# Patient Record
Sex: MALE | Race: WHITE | Employment: FULL TIME | URBAN - METROPOLITAN AREA
[De-identification: names, ages, dates, MRNs, and addresses within clinical notes are randomized per-mention and may not be internally consistent; named-entity substitution may affect disease eponyms.]

---

## 2017-03-14 ENCOUNTER — TELEPHONE (OUTPATIENT)
Dept: FAMILY MEDICINE CLINIC | Age: 57
End: 2017-03-14

## 2017-04-05 ENCOUNTER — OFFICE VISIT (OUTPATIENT)
Dept: FAMILY MEDICINE CLINIC | Age: 57
End: 2017-04-05
Payer: COMMERCIAL

## 2017-04-05 VITALS
WEIGHT: 194 LBS | OXYGEN SATURATION: 98 % | TEMPERATURE: 97.6 F | DIASTOLIC BLOOD PRESSURE: 81 MMHG | RESPIRATION RATE: 16 BRPM | HEIGHT: 69 IN | SYSTOLIC BLOOD PRESSURE: 134 MMHG | BODY MASS INDEX: 28.73 KG/M2 | HEART RATE: 68 BPM

## 2017-04-05 DIAGNOSIS — R90.89 ABNORMAL CT OF BRAIN: ICD-10-CM

## 2017-04-05 DIAGNOSIS — G56.92 NEUROPATHY OF HAND, LEFT: Primary | ICD-10-CM

## 2017-04-05 PROCEDURE — 99213 OFFICE O/P EST LOW 20 MIN: CPT | Performed by: FAMILY MEDICINE

## 2017-04-05 RX ORDER — MECLIZINE HYDROCHLORIDE 25 MG/1
25 TABLET ORAL
COMMUNITY
Start: 2017-04-02 | End: 2017-05-02

## 2017-04-05 RX ORDER — LORATADINE AND PSEUDOEPHEDRINE 10; 240 MG/1; MG/1
1 TABLET, EXTENDED RELEASE ORAL
COMMUNITY
End: 2017-04-05

## 2017-04-06 DIAGNOSIS — R90.89 ABNORMAL CT OF BRAIN: ICD-10-CM

## 2017-04-06 DIAGNOSIS — G56.92 NEUROPATHY OF HAND, LEFT: ICD-10-CM

## 2017-04-18 ENCOUNTER — OFFICE VISIT (OUTPATIENT)
Dept: FAMILY MEDICINE CLINIC | Age: 57
End: 2017-04-18
Payer: COMMERCIAL

## 2017-04-18 VITALS
DIASTOLIC BLOOD PRESSURE: 85 MMHG | HEART RATE: 90 BPM | RESPIRATION RATE: 16 BRPM | HEIGHT: 69 IN | BODY MASS INDEX: 29.18 KG/M2 | SYSTOLIC BLOOD PRESSURE: 140 MMHG | WEIGHT: 197 LBS | OXYGEN SATURATION: 98 %

## 2017-04-18 DIAGNOSIS — I63.9 CEREBROVASCULAR ACCIDENT (CVA), UNSPECIFIED MECHANISM (HCC): ICD-10-CM

## 2017-04-18 DIAGNOSIS — F41.9 ANXIETY: ICD-10-CM

## 2017-04-18 DIAGNOSIS — I10 ESSENTIAL HYPERTENSION: ICD-10-CM

## 2017-04-18 DIAGNOSIS — Z09 HOSPITAL DISCHARGE FOLLOW-UP: Primary | ICD-10-CM

## 2017-04-18 PROCEDURE — 99214 OFFICE O/P EST MOD 30 MIN: CPT | Performed by: FAMILY MEDICINE

## 2017-04-18 RX ORDER — ATORVASTATIN CALCIUM 40 MG/1
40 TABLET, FILM COATED ORAL
COMMUNITY
Start: 2017-04-06 | End: 2017-06-08

## 2017-04-18 RX ORDER — ASPIRIN 81 MG/1
81 TABLET, CHEWABLE ORAL
COMMUNITY
Start: 2017-04-06 | End: 2017-06-08

## 2017-04-18 RX ORDER — ALPRAZOLAM 0.5 MG/1
0.5 TABLET ORAL 3 TIMES DAILY PRN
Qty: 30 TABLET | Refills: 0 | Status: SHIPPED | OUTPATIENT
Start: 2017-04-18 | End: 2017-05-18

## 2017-04-18 RX ORDER — METOPROLOL SUCCINATE 50 MG/1
50 TABLET, EXTENDED RELEASE ORAL DAILY
Qty: 30 TABLET | Refills: 5 | Status: SHIPPED | OUTPATIENT
Start: 2017-04-18 | End: 2017-05-09 | Stop reason: SDUPTHER

## 2017-04-19 ENCOUNTER — TELEPHONE (OUTPATIENT)
Dept: FAMILY MEDICINE CLINIC | Age: 57
End: 2017-04-19

## 2017-04-20 DIAGNOSIS — I63.9 CEREBROVASCULAR ACCIDENT (CVA), UNSPECIFIED MECHANISM (HCC): ICD-10-CM

## 2017-04-20 DIAGNOSIS — R90.89 ABNORMAL BRAIN MRI: Primary | ICD-10-CM

## 2017-04-21 DIAGNOSIS — J20.8 ACUTE BRONCHITIS DUE TO OTHER SPECIFIED ORGANISMS: ICD-10-CM

## 2017-04-21 RX ORDER — FLUTICASONE FUROATE AND VILANTEROL TRIFENATATE 100; 25 UG/1; UG/1
1 POWDER RESPIRATORY (INHALATION) DAILY
Qty: 1 EACH | Refills: 11 | Status: SHIPPED | OUTPATIENT
Start: 2017-04-21 | End: 2017-06-08

## 2017-04-25 ENCOUNTER — TELEPHONE (OUTPATIENT)
Dept: FAMILY MEDICINE CLINIC | Age: 57
End: 2017-04-25

## 2017-04-26 ENCOUNTER — HOSPITAL ENCOUNTER (OUTPATIENT)
Dept: MRI IMAGING | Age: 57
Discharge: HOME | End: 2017-04-26
Payer: COMMERCIAL

## 2017-04-26 DIAGNOSIS — I63.9 CEREBROVASCULAR ACCIDENT (CVA), UNSPECIFIED MECHANISM (HCC): ICD-10-CM

## 2017-04-26 DIAGNOSIS — R90.89 ABNORMAL BRAIN MRI: ICD-10-CM

## 2017-04-26 PROCEDURE — 70544 MR ANGIOGRAPHY HEAD W/O DYE: CPT

## 2017-05-09 ENCOUNTER — OFFICE VISIT (OUTPATIENT)
Dept: FAMILY MEDICINE CLINIC | Age: 57
End: 2017-05-09
Payer: COMMERCIAL

## 2017-05-09 VITALS
SYSTOLIC BLOOD PRESSURE: 147 MMHG | BODY MASS INDEX: 29.95 KG/M2 | WEIGHT: 202.2 LBS | HEIGHT: 69 IN | RESPIRATION RATE: 16 BRPM | HEART RATE: 71 BPM | DIASTOLIC BLOOD PRESSURE: 96 MMHG

## 2017-05-09 DIAGNOSIS — J30.1 SEASONAL ALLERGIC RHINITIS DUE TO POLLEN: ICD-10-CM

## 2017-05-09 DIAGNOSIS — F41.9 ANXIETY: ICD-10-CM

## 2017-05-09 DIAGNOSIS — E78.2 MIXED HYPERLIPIDEMIA: ICD-10-CM

## 2017-05-09 DIAGNOSIS — I10 ESSENTIAL HYPERTENSION: ICD-10-CM

## 2017-05-09 DIAGNOSIS — H65.02 ACUTE SEROUS OTITIS MEDIA OF LEFT EAR, RECURRENCE NOT SPECIFIED: Primary | ICD-10-CM

## 2017-05-09 PROCEDURE — 99214 OFFICE O/P EST MOD 30 MIN: CPT | Performed by: FAMILY MEDICINE

## 2017-05-09 RX ORDER — CEFUROXIME AXETIL 500 MG/1
500 TABLET ORAL 2 TIMES DAILY
Qty: 20 TABLET | Refills: 0 | Status: SHIPPED | OUTPATIENT
Start: 2017-05-09 | End: 2017-05-19

## 2017-05-09 RX ORDER — ATORVASTATIN CALCIUM 20 MG/1
20 TABLET, FILM COATED ORAL DAILY
Qty: 30 TABLET | Refills: 2 | Status: SHIPPED | OUTPATIENT
Start: 2017-05-09 | End: 2017-06-08

## 2017-05-09 RX ORDER — LEVOCETIRIZINE DIHYDROCHLORIDE 5 MG/1
5 TABLET, FILM COATED ORAL NIGHTLY
Qty: 30 TABLET | Refills: 11 | Status: SHIPPED | OUTPATIENT
Start: 2017-05-09 | End: 2017-06-08

## 2017-05-09 RX ORDER — METOPROLOL SUCCINATE 50 MG/1
50 TABLET, EXTENDED RELEASE ORAL DAILY
Qty: 30 TABLET | Refills: 5 | Status: SHIPPED | OUTPATIENT
Start: 2017-05-09 | End: 2017-06-08

## 2017-05-09 RX ORDER — AMLODIPINE AND VALSARTAN 10; 320 MG/1; MG/1
1 TABLET ORAL DAILY
Qty: 30 TABLET | Refills: 5 | Status: SHIPPED | OUTPATIENT
Start: 2017-05-09 | End: 2017-06-08

## 2017-05-10 ENCOUNTER — OFFICE VISIT (OUTPATIENT)
Dept: PSYCHOLOGY | Age: 57
End: 2017-05-10
Payer: COMMERCIAL

## 2017-05-10 DIAGNOSIS — F06.4 ANXIETY DISORDER DUE TO MEDICAL CONDITION: Primary | ICD-10-CM

## 2017-05-10 DIAGNOSIS — I63.9 CEREBROVASCULAR ACCIDENT (CVA), UNSPECIFIED MECHANISM (HCC): ICD-10-CM

## 2017-05-10 PROCEDURE — 90791 PSYCH DIAGNOSTIC EVALUATION: CPT | Performed by: PSYCHOLOGIST

## 2017-05-16 ENCOUNTER — OFFICE VISIT (OUTPATIENT)
Dept: FAMILY MEDICINE CLINIC | Age: 57
End: 2017-05-16
Payer: COMMERCIAL

## 2017-05-16 VITALS
SYSTOLIC BLOOD PRESSURE: 138 MMHG | BODY MASS INDEX: 29.71 KG/M2 | HEIGHT: 69 IN | RESPIRATION RATE: 16 BRPM | DIASTOLIC BLOOD PRESSURE: 83 MMHG | HEART RATE: 80 BPM | WEIGHT: 200.62 LBS

## 2017-05-16 DIAGNOSIS — I10 ESSENTIAL HYPERTENSION: Primary | ICD-10-CM

## 2017-05-16 PROCEDURE — 99213 OFFICE O/P EST LOW 20 MIN: CPT | Performed by: FAMILY MEDICINE

## 2017-06-02 ENCOUNTER — TELEPHONE (OUTPATIENT)
Dept: FAMILY MEDICINE CLINIC | Age: 57
End: 2017-06-02

## 2017-06-03 RX ORDER — PREDNISONE 20 MG/1
TABLET ORAL
Qty: 20 TABLET | Refills: 0 | Status: SHIPPED | OUTPATIENT
Start: 2017-06-03 | End: 2017-06-08

## 2017-06-03 RX ORDER — ONDANSETRON HYDROCHLORIDE 8 MG/1
8 TABLET, FILM COATED ORAL EVERY 8 HOURS PRN
Qty: 30 TABLET | Refills: 0 | Status: SHIPPED | OUTPATIENT
Start: 2017-06-03 | End: 2017-06-08

## 2017-06-08 ENCOUNTER — OFFICE VISIT (OUTPATIENT)
Dept: FAMILY MEDICINE CLINIC | Age: 57
End: 2017-06-08
Payer: COMMERCIAL

## 2017-06-08 VITALS
HEART RATE: 69 BPM | WEIGHT: 202 LBS | RESPIRATION RATE: 16 BRPM | HEIGHT: 69 IN | BODY MASS INDEX: 29.92 KG/M2 | SYSTOLIC BLOOD PRESSURE: 143 MMHG | DIASTOLIC BLOOD PRESSURE: 84 MMHG

## 2017-06-08 DIAGNOSIS — H81.22 VESTIBULAR NEURITIS, LEFT: Primary | ICD-10-CM

## 2017-06-08 PROCEDURE — 99213 OFFICE O/P EST LOW 20 MIN: CPT | Performed by: FAMILY MEDICINE

## 2017-06-08 RX ORDER — AMLODIPINE AND VALSARTAN 10; 320 MG/1; MG/1
1 TABLET ORAL DAILY
COMMUNITY
End: 2021-11-01

## 2017-06-08 RX ORDER — FLUTICASONE FUROATE AND VILANTEROL 100; 25 UG/1; UG/1
1 POWDER RESPIRATORY (INHALATION) DAILY
COMMUNITY
End: 2021-11-01

## 2017-06-08 RX ORDER — AMOXICILLIN AND CLAVULANATE POTASSIUM 875; 125 MG/1; MG/1
1 TABLET, FILM COATED ORAL 2 TIMES DAILY WITH MEALS
Qty: 20 TABLET | Refills: 0 | Status: SHIPPED | OUTPATIENT
Start: 2017-06-08 | End: 2017-06-18

## 2017-06-08 RX ORDER — LEVOCETIRIZINE DIHYDROCHLORIDE 5 MG/1
5 TABLET, FILM COATED ORAL NIGHTLY
COMMUNITY
End: 2018-08-22

## 2017-06-08 RX ORDER — SILDENAFIL 100 MG/1
100 TABLET, FILM COATED ORAL PRN
COMMUNITY
End: 2018-08-22

## 2017-06-08 RX ORDER — METOPROLOL SUCCINATE 50 MG/1
50 TABLET, EXTENDED RELEASE ORAL DAILY
COMMUNITY
End: 2018-08-22

## 2017-06-08 RX ORDER — ALPRAZOLAM 0.5 MG/1
0.5 TABLET ORAL 3 TIMES DAILY PRN
COMMUNITY
End: 2021-11-01

## 2017-06-08 RX ORDER — ATORVASTATIN CALCIUM 40 MG/1
40 TABLET, FILM COATED ORAL DAILY
COMMUNITY
End: 2017-11-08

## 2017-06-12 ENCOUNTER — TELEPHONE (OUTPATIENT)
Dept: FAMILY MEDICINE CLINIC | Age: 57
End: 2017-06-12

## 2017-09-18 ENCOUNTER — OFFICE VISIT (OUTPATIENT)
Dept: FAMILY MEDICINE CLINIC | Age: 57
End: 2017-09-18
Payer: COMMERCIAL

## 2017-09-18 VITALS
RESPIRATION RATE: 16 BRPM | HEIGHT: 69 IN | HEART RATE: 59 BPM | DIASTOLIC BLOOD PRESSURE: 89 MMHG | BODY MASS INDEX: 29.86 KG/M2 | WEIGHT: 201.6 LBS | SYSTOLIC BLOOD PRESSURE: 157 MMHG

## 2017-09-18 DIAGNOSIS — J02.9 ACUTE PHARYNGITIS, UNSPECIFIED ETIOLOGY: Primary | ICD-10-CM

## 2017-09-18 DIAGNOSIS — N52.01 ERECTILE DYSFUNCTION DUE TO ARTERIAL INSUFFICIENCY: ICD-10-CM

## 2017-09-18 PROCEDURE — 99213 OFFICE O/P EST LOW 20 MIN: CPT | Performed by: FAMILY MEDICINE

## 2017-09-18 RX ORDER — LOTEPREDNOL ETABONATE 2 MG/ML
SUSPENSION/ DROPS OPHTHALMIC
Refills: 0 | COMMUNITY
Start: 2017-08-10

## 2017-09-18 RX ORDER — TADALAFIL 5 MG
TABLET ORAL
Refills: 1 | COMMUNITY
Start: 2017-09-04 | End: 2021-11-01

## 2017-09-18 RX ORDER — TADALAFIL 5 MG/1
5 TABLET ORAL PRN
Qty: 30 TABLET | Refills: 11 | Status: SHIPPED | OUTPATIENT
Start: 2017-09-18 | End: 2018-09-18 | Stop reason: SDUPTHER

## 2017-09-18 RX ORDER — BEPOTASTINE BESILATE 15 MG/ML
SOLUTION/ DROPS OPHTHALMIC
Refills: 0 | COMMUNITY
Start: 2017-08-10 | End: 2021-11-01

## 2017-09-18 RX ORDER — AMOXICILLIN AND CLAVULANATE POTASSIUM 875; 125 MG/1; MG/1
1 TABLET, FILM COATED ORAL 2 TIMES DAILY WITH MEALS
Qty: 20 TABLET | Refills: 0 | Status: SHIPPED | OUTPATIENT
Start: 2017-09-18 | End: 2017-09-28

## 2017-09-18 ASSESSMENT — PATIENT HEALTH QUESTIONNAIRE - PHQ9
SUM OF ALL RESPONSES TO PHQ9 QUESTIONS 1 & 2: 0
2. FEELING DOWN, DEPRESSED OR HOPELESS: 0
1. LITTLE INTEREST OR PLEASURE IN DOING THINGS: 0
SUM OF ALL RESPONSES TO PHQ QUESTIONS 1-9: 0

## 2017-11-08 ENCOUNTER — OFFICE VISIT (OUTPATIENT)
Dept: FAMILY MEDICINE CLINIC | Age: 57
End: 2017-11-08
Payer: COMMERCIAL

## 2017-11-08 VITALS
SYSTOLIC BLOOD PRESSURE: 146 MMHG | HEART RATE: 89 BPM | WEIGHT: 206 LBS | RESPIRATION RATE: 16 BRPM | BODY MASS INDEX: 30.51 KG/M2 | DIASTOLIC BLOOD PRESSURE: 90 MMHG | HEIGHT: 69 IN

## 2017-11-08 DIAGNOSIS — H65.23 CHRONIC SEROUS OTITIS MEDIA OF BOTH EARS: ICD-10-CM

## 2017-11-08 DIAGNOSIS — I10 ESSENTIAL HYPERTENSION: Primary | ICD-10-CM

## 2017-11-08 DIAGNOSIS — Z78.9 STATIN INTOLERANCE: ICD-10-CM

## 2017-11-08 DIAGNOSIS — E78.00 PURE HYPERCHOLESTEROLEMIA: ICD-10-CM

## 2017-11-08 PROCEDURE — 99214 OFFICE O/P EST MOD 30 MIN: CPT | Performed by: FAMILY MEDICINE

## 2017-11-08 RX ORDER — MESALAMINE 1.2 G/1
TABLET, DELAYED RELEASE ORAL
Refills: 0 | COMMUNITY
Start: 2017-11-06 | End: 2018-05-14 | Stop reason: SDUPTHER

## 2017-11-08 RX ORDER — METOPROLOL SUCCINATE 100 MG/1
100 TABLET, EXTENDED RELEASE ORAL DAILY
Qty: 30 TABLET | Refills: 3 | Status: SHIPPED | OUTPATIENT
Start: 2017-11-08 | End: 2018-08-22

## 2017-11-08 NOTE — PROGRESS NOTES
[facility] PHYSICIANS  COMPREHENSIVE CARE  [address]  [address] [address]  Dept: [phone]      Nini Ocampo is a 62 y.o. male who presents today for follow up on his  medical conditions as noted below.       Chief Complaint   Patient presents with    Medication Check     states the lipitor is causing him to having sore muscles/joints    Ear Fullness     off/on L ear fullness/fluid, sees ent next tuesday       Patient Active Problem List:     Seasonal allergies     Strep pharyngitis     BP (high blood pressure)     Cerebrovascular accident (CVA) ([facility] [address]     Past Medical History:   Diagnosis Date    BP (high blood pressure) 4/18/2017    Fatty liver     Seasonal allergies     Stroke [facility])       Past Surgical History:   Procedure Laterality Date    TONSILLECTOMY       Family History   Problem Relation Age of Onset    Cancer Mother        Current Outpatient Prescriptions   Medication Sig Dispense Refill    mesalamine (LIALDA) 1.2 g EC tablet take 2 tablets by mouth once daily  0    pitavastatin (LIVALO) 2 MG TABS tablet Take 1 tablet by mouth daily 30 tablet 2    metoprolol succinate (TOPROL XL) 100 MG extended release tablet Take 1 tablet by mouth daily 30 tablet 3    BEPREVE 1.5 % SOLN   0    ALREX 0.2 % SUSP   0    CIALIS 5 MG tablet   1    tadalafil (CIALIS) 5 MG tablet Take 1 tablet by mouth as needed for Erectile Dysfunction 30 tablet 11    ALPRAZolam (XANAX) 0.5 MG tablet Take 0.5 mg by mouth 3 times daily as needed for Sleep      amLODIPine-valsartan (EXFORGE)  MG per tablet Take 1 tablet by mouth daily      aspirin 81 MG tablet Take 81 mg by mouth daily      fluticasone-vilanterol (BREO ELLIPTA) 100-25 MCG/INH AEPB inhaler Inhale 1 puff into the lungs daily      levocetirizine (XYZAL) 5 MG tablet Take 5 mg by mouth nightly      metoprolol succinate (TOPROL XL) 50 MG extended release tablet Take 50 mg by mouth daily      sildenafil (VIAGRA) 100 MG tablet Take Skin is warm and dry. No rash noted. Psychiatric: He has a normal mood and affect. His   behavior is normal.       Assessment:      1. Essential hypertension    2. Pure hypercholesterolemia    3. Statin intolerance    4. Chronic serous otitis media of both ears          Plan:      Call or return to clinic prn if these symptoms worsen or fail to improve as anticipated. I have reviewed the instructions with the patient, answering all questions to his satisfaction. No Follow-up on file. No orders of the defined types were placed in this encounter.     Orders Placed This Encounter   Medications    pitavastatin (LIVALO) 2 MG TABS tablet     Sig: Take 1 tablet by mouth daily     Dispense:  30 tablet     Refill:  2    metoprolol succinate (TOPROL XL) 100 MG extended release tablet     Sig: Take 1 tablet by mouth daily     Dispense:  30 tablet     Refill:  3    discuss patient that he really needs to follow-up with ENT and I feel he probably needs a tube  He needs to increase his metoprolol to 100 mg  Stop the Lipitor and try Livalo and follow-up in the office if not improving    Electronically signed by Saranya Chavez DO on 11/8/2017 at 2:09 PM

## 2017-11-08 NOTE — PROGRESS NOTES
Have you seen any other physician or provider since your last visit no    Have you had any other diagnostic tests since your last visit? no    Have you changed or stopped any medications since your last visit including any over-the-counter medicines, vitamins, or herbal medicines? no     Are you taking all your prescribed medications? Yes  If NO, why? -  N/A           Patient Self-Management Goal for this visit.    What is your goal for your visit today? - med check   Barriers to success: none   Plan for overcoming my barriers: N/A      Confidence: 10/10   Date goal set: 11/8/17   Date expected to reach goal: 2days

## 2017-12-18 DIAGNOSIS — I10 ESSENTIAL HYPERTENSION: Primary | ICD-10-CM

## 2017-12-18 DIAGNOSIS — R97.20 ELEVATED PSA: ICD-10-CM

## 2017-12-26 ENCOUNTER — TELEPHONE (OUTPATIENT)
Dept: FAMILY MEDICINE CLINIC | Age: 57
End: 2017-12-26

## 2017-12-26 RX ORDER — LEVOFLOXACIN 500 MG/1
500 TABLET, FILM COATED ORAL DAILY
Qty: 10 TABLET | Refills: 0 | Status: SHIPPED | OUTPATIENT
Start: 2017-12-26 | End: 2018-01-05

## 2017-12-26 RX ORDER — BENZONATATE 100 MG/1
200 CAPSULE ORAL 3 TIMES DAILY PRN
Qty: 60 CAPSULE | Refills: 0 | Status: SHIPPED | OUTPATIENT
Start: 2017-12-26 | End: 2018-01-05

## 2017-12-26 NOTE — TELEPHONE ENCOUNTER
Patient calling for med to be called in for possible URI x10 days coughing up yellow mucus.  I told him he would need to be seen, but he wanted me to send a message first.

## 2018-03-19 DIAGNOSIS — Z78.9 STATIN INTOLERANCE: ICD-10-CM

## 2018-03-19 DIAGNOSIS — E78.00 PURE HYPERCHOLESTEROLEMIA: ICD-10-CM

## 2018-03-20 RX ORDER — PITAVASTATIN CALCIUM 2.09 MG/1
TABLET, FILM COATED ORAL
Qty: 30 TABLET | Refills: 2 | Status: SHIPPED | OUTPATIENT
Start: 2018-03-20 | End: 2018-06-25 | Stop reason: SDUPTHER

## 2018-05-14 ENCOUNTER — OFFICE VISIT (OUTPATIENT)
Dept: FAMILY MEDICINE CLINIC | Age: 58
End: 2018-05-14
Payer: COMMERCIAL

## 2018-05-14 VITALS
TEMPERATURE: 97.6 F | BODY MASS INDEX: 30.81 KG/M2 | HEART RATE: 69 BPM | RESPIRATION RATE: 16 BRPM | HEIGHT: 69 IN | OXYGEN SATURATION: 97 % | WEIGHT: 208 LBS | SYSTOLIC BLOOD PRESSURE: 135 MMHG | DIASTOLIC BLOOD PRESSURE: 82 MMHG

## 2018-05-14 DIAGNOSIS — K12.0 CANKER SORES ORAL: Primary | ICD-10-CM

## 2018-05-14 DIAGNOSIS — J02.9 ACUTE VIRAL PHARYNGITIS: ICD-10-CM

## 2018-05-14 DIAGNOSIS — K51.011 ULCERATIVE PANCOLITIS WITH RECTAL BLEEDING (HCC): ICD-10-CM

## 2018-05-14 DIAGNOSIS — J30.1 SEASONAL ALLERGIC RHINITIS DUE TO POLLEN: ICD-10-CM

## 2018-05-14 PROBLEM — K51.90 ULCERATIVE COLITIS (HCC): Status: ACTIVE | Noted: 2018-05-14

## 2018-05-14 PROCEDURE — 99214 OFFICE O/P EST MOD 30 MIN: CPT | Performed by: FAMILY MEDICINE

## 2018-05-14 RX ORDER — MESALAMINE 1.2 G/1
TABLET, DELAYED RELEASE ORAL
Qty: 60 TABLET | Refills: 0 | Status: SHIPPED | OUTPATIENT
Start: 2018-05-14 | End: 2021-11-01

## 2018-05-14 RX ORDER — LEVOCETIRIZINE DIHYDROCHLORIDE 5 MG/1
TABLET, FILM COATED ORAL
Qty: 30 TABLET | Refills: 11 | Status: SHIPPED | OUTPATIENT
Start: 2018-05-14

## 2018-05-14 RX ORDER — DEXAMETHASONE 0.5 MG/5ML
1 ELIXIR ORAL 4 TIMES DAILY
Qty: 400 ML | Refills: 0 | Status: SHIPPED | OUTPATIENT
Start: 2018-05-14 | End: 2018-05-24

## 2018-05-14 RX ORDER — CEFUROXIME AXETIL 500 MG/1
500 TABLET ORAL 2 TIMES DAILY
Qty: 20 TABLET | Refills: 0 | Status: SHIPPED | OUTPATIENT
Start: 2018-05-14 | End: 2018-05-24

## 2018-05-14 ASSESSMENT — PATIENT HEALTH QUESTIONNAIRE - PHQ9
SUM OF ALL RESPONSES TO PHQ9 QUESTIONS 1 & 2: 0
1. LITTLE INTEREST OR PLEASURE IN DOING THINGS: 0
SUM OF ALL RESPONSES TO PHQ QUESTIONS 1-9: 0
2. FEELING DOWN, DEPRESSED OR HOPELESS: 0

## 2018-06-05 ENCOUNTER — HOSPITAL ENCOUNTER (OUTPATIENT)
Age: 58
Discharge: HOME | End: 2018-06-05
Payer: COMMERCIAL

## 2018-06-05 PROCEDURE — 36415 COLL VENOUS BLD VENIPUNCTURE: CPT

## 2018-06-05 PROCEDURE — G0103 PSA SCREENING: HCPCS

## 2018-06-06 LAB — PROSTATE SPECIFIC ANTIGEN: 4.97 UG/L

## 2018-08-17 ENCOUNTER — TELEPHONE (OUTPATIENT)
Dept: FAMILY MEDICINE CLINIC | Age: 58
End: 2018-08-17

## 2018-08-17 DIAGNOSIS — M79.673 PAIN OF FOOT, UNSPECIFIED LATERALITY: Primary | ICD-10-CM

## 2018-08-20 NOTE — TELEPHONE ENCOUNTER
There is no DO who does foot and ankle. There are orthopedic foot and ankle specialist can refer him to.   Okay to just give them their names

## 2018-08-22 ENCOUNTER — OFFICE VISIT (OUTPATIENT)
Dept: FAMILY MEDICINE CLINIC | Age: 58
End: 2018-08-22
Payer: COMMERCIAL

## 2018-08-22 VITALS
RESPIRATION RATE: 16 BRPM | HEIGHT: 69 IN | HEART RATE: 69 BPM | SYSTOLIC BLOOD PRESSURE: 155 MMHG | WEIGHT: 200 LBS | BODY MASS INDEX: 29.62 KG/M2 | DIASTOLIC BLOOD PRESSURE: 86 MMHG

## 2018-08-22 DIAGNOSIS — M72.2 PLANTAR FASCIITIS OF LEFT FOOT: Primary | ICD-10-CM

## 2018-08-22 PROCEDURE — 99213 OFFICE O/P EST LOW 20 MIN: CPT | Performed by: FAMILY MEDICINE

## 2018-08-22 ASSESSMENT — PATIENT HEALTH QUESTIONNAIRE - PHQ9
1. LITTLE INTEREST OR PLEASURE IN DOING THINGS: 0
SUM OF ALL RESPONSES TO PHQ9 QUESTIONS 1 & 2: 0
2. FEELING DOWN, DEPRESSED OR HOPELESS: 0
SUM OF ALL RESPONSES TO PHQ QUESTIONS 1-9: 0
SUM OF ALL RESPONSES TO PHQ QUESTIONS 1-9: 0

## 2018-08-22 NOTE — PROGRESS NOTES
use No        LDL Cholesterol (mg/dL)   Date Value   06/23/2016 96     HDL (mg/dL)   Date Value   06/23/2016 37 (L)     BUN (mg/dL)   Date Value   06/23/2016 19     CREATININE (mg/dL)   Date Value   06/23/2016 0.89     Glucose (mg/dL)   Date Value   06/23/2016 95              Subjective:      HPI  He is here today complaining of left heel pain and he is tried to start running and isn't ready for the last month and the heel pain started he didn't have necessary specific running shoes he claims to have been stretching it is been better since he's been resting it  the pain is worse in the morning has not tried any anti-inflammatories    Review of Systems:     Constitutional: Negative for fever, appetite change and fatigue. Family social and medical history reviewed and unchanged     HENT: Negative. Negative for nosebleeds, trouble swallowing and neck pain. Eyes: Negative for photophobia and visual disturbance. Respiratory: Negative. Negative for chest tightness and shortness of breath. Cardiovascular: Negative. Negative for chest pain and leg swelling. Gastrointestinal: Negative. Negative for abdominal pain and blood in stool. Endocrine: Negative for cold intolerance and polyuria. Genitourinary: Negative for dysuria and hematuria. Musculoskeletal: Negative. Skin: Negative for rash. Allergic/Immunologic: Negative. Neurological: Negative. Negative for dizziness, weakness and numbness. Hematological: Negative. Negative for adenopathy. Does not bruise/bleed easily. Psychiatric/Behavioral: Negative for sleep disturbance, dysphoric mood and  decreased concentration. The patient is not nervous/anxious. Objective:     Physical Exam:     Nursing note and vitals reviewed. BP (!) 155/86   Pulse 69   Resp 16   Ht 5' 8.5\" (1.74 m)   Wt 200 lb (90.7 kg)   BMI 29.97 kg/m²   Constitutional: He is oriented to person, place, and time.  He   appears well-developed and

## 2018-09-04 ENCOUNTER — OFFICE VISIT (OUTPATIENT)
Dept: PODIATRY | Age: 58
End: 2018-09-04
Payer: COMMERCIAL

## 2018-09-04 VITALS — BODY MASS INDEX: 30.46 KG/M2 | RESPIRATION RATE: 16 BRPM | HEIGHT: 68 IN | WEIGHT: 201 LBS

## 2018-09-04 DIAGNOSIS — M76.61 ACHILLES TENDINITIS, RIGHT LEG: ICD-10-CM

## 2018-09-04 DIAGNOSIS — S93.491A SPRAIN OF ANTERIOR TALOFIBULAR LIGAMENT OF RIGHT ANKLE, INITIAL ENCOUNTER: Primary | ICD-10-CM

## 2018-09-04 DIAGNOSIS — M25.571 ACUTE RIGHT ANKLE PAIN: ICD-10-CM

## 2018-09-04 PROCEDURE — 99203 OFFICE O/P NEW LOW 30 MIN: CPT | Performed by: PODIATRIST

## 2018-09-04 PROCEDURE — L4361 PNEUMA/VAC WALK BOOT PRE OTS: HCPCS | Performed by: PODIATRIST

## 2018-09-04 NOTE — PROGRESS NOTES
[facility] PHYSICIANS  [facility] [address]  [address] [facility] [address]  Dept: [phone]  Dept Fax: [phone]    NEW PATIENT PROGRESS NOTE  Date of patient's visit: 9/4/2018  Patient's Name:  Janie Wagoner YOB: 1960            Patient Care Team:  Demarcus Williamson DO as PCP - General (Family Medicine)        Chief Complaint   Patient presents with    New Patient    Ankle Pain    Foot Injury      x2 month         HPI: Janie Wagoner is a 62 y.o. male who presents to the office today complaining of left heel and ankle pain. Worse in the morning  Symptoms began 2 month(s) ago. Patient relates pain is Present. Pain is rated 7 out of 10 and is described as intermittent. Treatments prior to today's visit include: saw pcp who referred to  podiatry. Currently denies F/C/N/V. Allergies   Allergen Reactions    Clindamycin/Lincomycin Diarrhea    Seasonal        Past Medical History:   Diagnosis Date    BP (high blood pressure) 4/18/2017    Fatty liver     Seasonal allergies     Stroke [facility])        Prior to Admission medications    Medication Sig Start Date End Date Taking?  Authorizing Provider   LIVALO 2 MG TABS tablet take 1 tablet by mouth once daily 6/25/18   Jory Vargas DO   mesalamine (LIALDA) 1.2 g EC tablet Take 2 tablets by mouth once daily 5/14/18   Jory Vargas DO   levocetirizine (XYZAL) 5 MG tablet take 1 tablet by mouth every evening 5/14/18   Jory Vargas DO   BEPREVE 1.5 % SOLN  8/10/17   Historical Provider, MD   ALREX 0.2 % SUSP  8/10/17   Historical Provider, MD   CIALIS 5 MG tablet  9/4/17   Historical Provider, MD   tadalafil (CIALIS) 5 MG tablet Take 1 tablet by mouth as needed for Erectile Dysfunction 9/18/17   Jory Vargas DO   ALPRAZolam (XANAX) 0.5 MG tablet Take 0.5 mg by mouth 3 times daily as needed for Sleep    Historical Provider, MD   amLODIPine-valsartan (EXFORGE)  MG per tablet Take 1 tablet by mouth daily Historical Provider, MD   aspirin 81 MG tablet Take 81 mg by mouth daily    Historical Provider, MD   fluticasone-vilanterol (BREO ELLIPTA) 100-25 MCG/INH AEPB inhaler Inhale 1 puff into the lungs daily    Historical Provider, MD       Past Surgical History:   Procedure Laterality Date    TONSILLECTOMY         Family History   Problem Relation Age of Onset    Cancer Mother        Social History   Substance Use Topics    Smoking status: Never Smoker    Smokeless tobacco: Never Used    Alcohol use No       Review of Systems  Review of Systems - History obtained from chart review and the patient  General ROS: negative for - chills, fatigue, fever, night sweats or weight gain  Constitutional: Negative for chills, diaphoresis, fatigue, fever and unexpected weight change. Musculoskeletal: Positive for arthralgias, gait problem and joint swelling. Neurological ROS: negative for - behavioral changes, confusion, headaches or seizures. Negative for weakness and numbness. Dermatological ROS: negative for - mole changes, rash  Cardiovascular: Negative for leg swelling. Gastrointestinal: Negative for constipation, diarrhea, nausea and vomiting. Lower Extremity Physical Examination:     Vitals:   Vitals:    09/04/18 1505   Resp: 16     General: AAO x 3 in NAD. Dermatologic Exam:  Skin lesion/ulceration Absent . Skin No rashes or nodules noted. .       Musculoskeletal:     1st MPJ ROM decreased, Bilateral.  Muscle strength 5/5, Bilateral.  Pain present upon palpation of right postrior achilles tendon at the posterior heel right. Pain increased with Dorsiflexion of the right ankle. Medial longitudinal arch, Bilateral WNL.   Ankle ROM WNL,Bilateral.    Dorsally contracted digits absent digits 1-5 Bilateral.     Vascular: DP and PT pulses palpable 2/4, Bilateral.  CFT <3 seconds, Bilateral.  Hair growth present to the level of the digits, Bilateral.  Edema absent, Bilateral.  Varicosities absent,

## 2018-09-18 ENCOUNTER — OFFICE VISIT (OUTPATIENT)
Dept: PODIATRY | Age: 58
End: 2018-09-18
Payer: COMMERCIAL

## 2018-09-18 VITALS — WEIGHT: 201 LBS | BODY MASS INDEX: 30.46 KG/M2 | HEIGHT: 68 IN | RESPIRATION RATE: 16 BRPM

## 2018-09-18 DIAGNOSIS — M79.671 PAIN IN BOTH FEET: Primary | ICD-10-CM

## 2018-09-18 DIAGNOSIS — M79.672 PAIN IN BOTH FEET: Primary | ICD-10-CM

## 2018-09-18 DIAGNOSIS — M25.571 ACUTE RIGHT ANKLE PAIN: ICD-10-CM

## 2018-09-18 DIAGNOSIS — N52.01 ERECTILE DYSFUNCTION DUE TO ARTERIAL INSUFFICIENCY: ICD-10-CM

## 2018-09-18 PROCEDURE — 99213 OFFICE O/P EST LOW 20 MIN: CPT | Performed by: PODIATRIST

## 2018-09-18 NOTE — PROGRESS NOTES
[facility] PHYSICIANS  [facility] [address]  [address] [facility] [address]   Dept: [phone]  Dept Fax: [phone]    RETURN PATIENT PROGRESS NOTE  Date of patient's visit: 9/18/2018  Patient's Name:  Magy Miller YOB: 1960            Patient Care Team:  Amando Cervantes DO as PCP - General (Family Medicine)       Magy Miller 62 y.o. male that presents for follow-up of   Chief Complaint   Patient presents with    Ankle Pain    Follow-up       Symptoms began 10 week(s) ago and are decreased . Patient relates pain is Present. Pain is rated 3 out of 10 and is described as intermittent. Treatments prior to today's visit include: was placed in walking boot  Which has improved the ankle. Currently denies F/C/N/V. Allergies   Allergen Reactions    Clindamycin/Lincomycin Diarrhea    Seasonal        Past Medical History:   Diagnosis Date    BP (high blood pressure) 4/18/2017    Fatty liver     Seasonal allergies     Stroke [facility])        Prior to Admission medications    Medication Sig Start Date End Date Taking?  Authorizing Provider   LIVALO 2 MG TABS tablet take 1 tablet by mouth once daily 6/25/18   Jory Vargas DO   mesalamine (LIALDA) 1.2 g EC tablet Take 2 tablets by mouth once daily 5/14/18   Jory Vargas DO   levocetirizine (XYZAL) 5 MG tablet take 1 tablet by mouth every evening 5/14/18   Jory Vargas DO   BEPREVE 1.5 % SOLN  8/10/17   Historical Provider, MD   ALREX 0.2 % SUSP  8/10/17   Historical Provider, MD   CIALIS 5 MG tablet  9/4/17   Historical Provider, MD   tadalafil (CIALIS) 5 MG tablet Take 1 tablet by mouth as needed for Erectile Dysfunction 9/18/17   Jory Vargas DO   ALPRAZolam (XANAX) 0.5 MG tablet Take 0.5 mg by mouth 3 times daily as needed for Sleep    Historical Provider, MD   amLODIPine-valsartan (EXFORGE)  MG per tablet Take 1 tablet by mouth daily    Historical Provider, MD   aspirin 81 MG tablet Take 81 mg by mouth daily    Historical Provider, MD   fluticasone-vilanterol (BREO ELLIPTA) 100-25 MCG/INH AEPB inhaler Inhale 1 puff into the lungs daily    Historical Provider, MD       Review of Systems  Review of Systems - History obtained from chart review and the patient  General ROS: negative for - chills, fatigue, fever, night sweats or weight gain  Constitutional: Negative for chills, diaphoresis, fatigue, fever and unexpected weight change. Musculoskeletal: Positive for arthralgias, gait problem and joint swelling. Neurological ROS: negative for - behavioral changes, confusion, headaches or seizures. Negative for weakness and numbness. Dermatological ROS: negative for - mole changes, rash  Cardiovascular: Negative for leg swelling. Gastrointestinal: Negative for constipation, diarrhea, nausea and vomiting. Lower Extremity Physical Examination:     Vitals:   Vitals:    09/18/18 1510   Resp: 16     General: AAO x 3 in NAD. Dermatologic Exam:  Skin lesion/ulceration Absent . Skin No rashes or nodules noted. .       Musculoskeletal:     1st MPJ ROM decreased, Bilateral.  Muscle strength 5/5, Bilateral.  Pain present upon palpation of right postrior achilles tendon at the posterior heel right. Pain increased with Dorsiflexion of the right ankle. Medial longitudinal arch, Bilateral WNL.   Ankle ROM WNL,Bilateral.    Dorsally contracted digits absent digits 1-5 Bilateral.     Vascular: DP and PT pulses palpable 2/4, Bilateral.  CFT <3 seconds, Bilateral.  Hair growth present to the level of the digits, Bilateral.  Edema absent, Bilateral.  Varicosities absent, Bilateral. Erythema absent, Bilateral    Neurological: Sensation intact to light touch to level of digits, Bilateral.  Protective sensation intact 10/10 sites via 5.07/10g [place] Monofilament, Bilateral.  negative Tinel's, Bilateral.  negative Valleix sign, Bilateral.      Integument: Warm, dry, supple, Bilateral.  Open lesion absent,

## 2018-09-19 RX ORDER — TADALAFIL 5 MG
TABLET ORAL
Qty: 30 TABLET | Refills: 11 | Status: SHIPPED | OUTPATIENT
Start: 2018-09-19 | End: 2021-11-01

## 2018-10-09 ENCOUNTER — OFFICE VISIT (OUTPATIENT)
Dept: PODIATRY | Age: 58
End: 2018-10-09

## 2018-10-09 DIAGNOSIS — M72.2 PLANTAR FASCIAL FIBROMATOSIS: Primary | ICD-10-CM

## 2018-10-09 PROCEDURE — 99999 PR OFFICE/OUTPT VISIT,PROCEDURE ONLY: CPT | Performed by: PODIATRIST

## 2019-06-24 DIAGNOSIS — E78.00 PURE HYPERCHOLESTEROLEMIA: ICD-10-CM

## 2019-06-24 DIAGNOSIS — Z78.9 STATIN INTOLERANCE: ICD-10-CM

## 2019-06-25 RX ORDER — PITAVASTATIN CALCIUM 2.09 MG/1
TABLET, FILM COATED ORAL
Qty: 30 TABLET | Refills: 11 | Status: SHIPPED | OUTPATIENT
Start: 2019-06-25 | End: 2019-12-02 | Stop reason: SDUPTHER

## 2019-11-07 ENCOUNTER — TELEPHONE (OUTPATIENT)
Dept: FAMILY MEDICINE CLINIC | Age: 59
End: 2019-11-07

## 2019-11-07 NOTE — TELEPHONE ENCOUNTER
pts states he needs anopther PA for Livalo because [employer] insurance policy changed last month.   [employer] PA number [phone]

## 2019-11-07 NOTE — TELEPHONE ENCOUNTER
Not had blood work since 2017 [employer] will never authorize it unless he has a current cholesterol level  He needs an appointment

## 2019-11-21 ENCOUNTER — NURSE TRIAGE (OUTPATIENT)
Dept: OTHER | Facility: CLINIC | Age: 59
End: 2019-11-21

## 2019-12-02 ENCOUNTER — OFFICE VISIT (OUTPATIENT)
Dept: FAMILY MEDICINE CLINIC | Age: 59
End: 2019-12-02
Payer: COMMERCIAL

## 2019-12-02 VITALS
HEIGHT: 69 IN | HEART RATE: 68 BPM | WEIGHT: 201 LBS | DIASTOLIC BLOOD PRESSURE: 91 MMHG | OXYGEN SATURATION: 98 % | BODY MASS INDEX: 29.77 KG/M2 | RESPIRATION RATE: 16 BRPM | SYSTOLIC BLOOD PRESSURE: 154 MMHG

## 2019-12-02 DIAGNOSIS — E78.00 PURE HYPERCHOLESTEROLEMIA: Primary | ICD-10-CM

## 2019-12-02 DIAGNOSIS — Z78.9 STATIN INTOLERANCE: ICD-10-CM

## 2019-12-02 LAB
CHOLESTEROL, TOTAL: 154 MG/DL
CHOLESTEROL/HDL RATIO: 3.9
HDLC SERPL-MCNC: 39 MG/DL (ref 35–70)
LDL CHOLESTEROL CALCULATED: 85 MG/DL (ref 0–160)
TRIGL SERPL-MCNC: 150 MG/DL
VLDLC SERPL CALC-MCNC: 30 MG/DL

## 2019-12-02 PROCEDURE — 99213 OFFICE O/P EST LOW 20 MIN: CPT | Performed by: FAMILY MEDICINE

## 2019-12-02 ASSESSMENT — PATIENT HEALTH QUESTIONNAIRE - PHQ9
SUM OF ALL RESPONSES TO PHQ9 QUESTIONS 1 & 2: 0
SUM OF ALL RESPONSES TO PHQ QUESTIONS 1-9: 0
1. LITTLE INTEREST OR PLEASURE IN DOING THINGS: 0
SUM OF ALL RESPONSES TO PHQ QUESTIONS 1-9: 0
2. FEELING DOWN, DEPRESSED OR HOPELESS: 0

## 2019-12-03 ENCOUNTER — TELEPHONE (OUTPATIENT)
Dept: FAMILY MEDICINE CLINIC | Age: 59
End: 2019-12-03

## 2019-12-10 ENCOUNTER — NURSE ONLY (OUTPATIENT)
Dept: FAMILY MEDICINE CLINIC | Age: 59
End: 2019-12-10
Payer: COMMERCIAL

## 2019-12-10 DIAGNOSIS — Z23 NEED FOR INFLUENZA VACCINATION: Primary | ICD-10-CM

## 2019-12-10 PROCEDURE — 90471 IMMUNIZATION ADMIN: CPT | Performed by: FAMILY MEDICINE

## 2019-12-10 PROCEDURE — 90686 IIV4 VACC NO PRSV 0.5 ML IM: CPT | Performed by: FAMILY MEDICINE

## 2019-12-12 ENCOUNTER — TELEPHONE (OUTPATIENT)
Dept: FAMILY MEDICINE CLINIC | Age: 59
End: 2019-12-12

## 2019-12-12 RX ORDER — ROSUVASTATIN CALCIUM 20 MG/1
20 TABLET, COATED ORAL NIGHTLY
Qty: 90 TABLET | Refills: 3 | Status: SHIPPED | OUTPATIENT
Start: 2019-12-12 | End: 2020-12-03 | Stop reason: SDUPTHER

## 2020-12-03 ENCOUNTER — VIRTUAL VISIT (OUTPATIENT)
Dept: FAMILY MEDICINE CLINIC | Age: 60
End: 2020-12-03
Payer: COMMERCIAL

## 2020-12-03 PROCEDURE — 99443 PR PHYS/QHP TELEPHONE EVALUATION 21-30 MIN: CPT | Performed by: FAMILY MEDICINE

## 2020-12-03 RX ORDER — METOPROLOL SUCCINATE 25 MG/1
TABLET, EXTENDED RELEASE ORAL
COMMUNITY
Start: 2020-11-30

## 2020-12-03 RX ORDER — LISINOPRIL 5 MG/1
TABLET ORAL
COMMUNITY
Start: 2020-11-30

## 2020-12-03 RX ORDER — ROSUVASTATIN CALCIUM 20 MG/1
20 TABLET, COATED ORAL NIGHTLY
Qty: 90 TABLET | Refills: 3 | Status: SHIPPED | OUTPATIENT
Start: 2020-12-03 | End: 2022-04-20

## 2020-12-03 SDOH — ECONOMIC STABILITY: FOOD INSECURITY: WITHIN THE PAST 12 MONTHS, YOU WORRIED THAT YOUR FOOD WOULD RUN OUT BEFORE YOU GOT MONEY TO BUY MORE.: NEVER TRUE

## 2020-12-03 SDOH — ECONOMIC STABILITY: INCOME INSECURITY: HOW HARD IS IT FOR YOU TO PAY FOR THE VERY BASICS LIKE FOOD, HOUSING, MEDICAL CARE, AND HEATING?: NOT HARD AT ALL

## 2020-12-03 SDOH — ECONOMIC STABILITY: FOOD INSECURITY: WITHIN THE PAST 12 MONTHS, THE FOOD YOU BOUGHT JUST DIDN'T LAST AND YOU DIDN'T HAVE MONEY TO GET MORE.: NEVER TRUE

## 2020-12-03 ASSESSMENT — PATIENT HEALTH QUESTIONNAIRE - PHQ9
SUM OF ALL RESPONSES TO PHQ QUESTIONS 1-9: 0
SUM OF ALL RESPONSES TO PHQ QUESTIONS 1-9: 0
2. FEELING DOWN, DEPRESSED OR HOPELESS: 0
SUM OF ALL RESPONSES TO PHQ9 QUESTIONS 1 & 2: 0
1. LITTLE INTEREST OR PLEASURE IN DOING THINGS: 0
SUM OF ALL RESPONSES TO PHQ QUESTIONS 1-9: 0

## 2020-12-03 NOTE — PROGRESS NOTES
Alaina Villanueva is a 61 y.o. male evaluated via telephone on 12/3/2020. Consent:  He and/or health care decision maker is aware that that he may receive a bill for this telephone service, depending on his insurance coverage, and has provided verbal consent to proceed: Yes      Documentation:  I communicated with the patient and/or health care decision maker about being seen today in phone call visit for a follow-up on all of his ongoing medical issues he has filed for disability because of his stroke and causing dizziness and hearing issues he has been years so he is no longer to work as a   He is been diagnosed with ulcerative colitis so has a lot of issues with his bowels diagnosed with prostate cancer and is undergoing radiation treatment which is also creating more issues for him  He states in general he he just is not doing well and not able to function in a work environment. Details of this discussion including any medical advice provided: I advised patient that he is due to get laboratory studies done I have placed an order for those I also advised him he needs to get a pneumonia and shingles vaccine he needs to call his insurance and see where he needs to get those done continue following with all specialist and follow-up as needed with me      I affirm this is a Patient Initiated Episode with a Patient who has not had a related appointment within my department in the past 7 days or scheduled within the next 24 hours.     Patient identification was verified at the start of the visit: Yes    Total Time: minutes: 21-30 minutes    Note: not billable if this call serves to triage the patient into an appointment for the relevant concern      Kylee Bradley

## 2020-12-11 LAB
ALBUMIN SERPL-MCNC: NORMAL G/DL
ALP BLD-CCNC: NORMAL U/L
ALT SERPL-CCNC: NORMAL U/L
ANION GAP SERPL CALCULATED.3IONS-SCNC: NORMAL MMOL/L
AST SERPL-CCNC: NORMAL U/L
BASOPHILS ABSOLUTE: NORMAL
BASOPHILS RELATIVE PERCENT: NORMAL
BILIRUB SERPL-MCNC: NORMAL MG/DL
BUN BLDV-MCNC: NORMAL MG/DL
CALCIUM SERPL-MCNC: NORMAL MG/DL
CHLORIDE BLD-SCNC: NORMAL MMOL/L
CHOLESTEROL, TOTAL: 136 MG/DL
CHOLESTEROL/HDL RATIO: 4.4
CO2: NORMAL
CREAT SERPL-MCNC: NORMAL MG/DL
EOSINOPHILS ABSOLUTE: NORMAL
EOSINOPHILS RELATIVE PERCENT: NORMAL
GFR CALCULATED: NORMAL
GLUCOSE BLD-MCNC: NORMAL MG/DL
HCT VFR BLD CALC: NORMAL %
HDLC SERPL-MCNC: 31 MG/DL (ref 35–70)
HEMOGLOBIN: NORMAL
LDL CHOLESTEROL CALCULATED: 83 MG/DL (ref 0–160)
LYMPHOCYTES ABSOLUTE: NORMAL
LYMPHOCYTES RELATIVE PERCENT: NORMAL
MCH RBC QN AUTO: NORMAL PG
MCHC RBC AUTO-ENTMCNC: NORMAL G/DL
MCV RBC AUTO: NORMAL FL
MONOCYTES ABSOLUTE: NORMAL
MONOCYTES RELATIVE PERCENT: NORMAL
NEUTROPHILS ABSOLUTE: NORMAL
NEUTROPHILS RELATIVE PERCENT: NORMAL
NONHDLC SERPL-MCNC: ABNORMAL MG/DL
PDW BLD-RTO: NORMAL %
PLATELET # BLD: NORMAL 10*3/UL
PMV BLD AUTO: NORMAL FL
POTASSIUM SERPL-SCNC: NORMAL MMOL/L
RBC # BLD: NORMAL 10*6/UL
SODIUM BLD-SCNC: NORMAL MMOL/L
T4 FREE: NORMAL
TOTAL PROTEIN: NORMAL
TRIGL SERPL-MCNC: 108 MG/DL
TSH SERPL DL<=0.05 MIU/L-ACNC: NORMAL M[IU]/L
VITAMIN D 25-HYDROXY: NORMAL
VITAMIN D2, 25 HYDROXY: NORMAL
VITAMIN D3,25 HYDROXY: NORMAL
VLDLC SERPL CALC-MCNC: 22 MG/DL
WBC # BLD: NORMAL 10*3/UL

## 2021-11-01 ENCOUNTER — OFFICE VISIT (OUTPATIENT)
Dept: FAMILY MEDICINE CLINIC | Age: 61
End: 2021-11-01
Payer: COMMERCIAL

## 2021-11-01 VITALS
SYSTOLIC BLOOD PRESSURE: 115 MMHG | HEIGHT: 69 IN | DIASTOLIC BLOOD PRESSURE: 76 MMHG | OXYGEN SATURATION: 98 % | WEIGHT: 190.6 LBS | HEART RATE: 81 BPM | BODY MASS INDEX: 28.23 KG/M2

## 2021-11-01 DIAGNOSIS — E78.00 PURE HYPERCHOLESTEROLEMIA: ICD-10-CM

## 2021-11-01 DIAGNOSIS — C67.9 MALIGNANT NEOPLASM OF URINARY BLADDER, UNSPECIFIED SITE (HCC): ICD-10-CM

## 2021-11-01 DIAGNOSIS — I63.9 CEREBROVASCULAR ACCIDENT (CVA), UNSPECIFIED MECHANISM (HCC): ICD-10-CM

## 2021-11-01 DIAGNOSIS — Z23 NEED FOR VACCINATION: ICD-10-CM

## 2021-11-01 DIAGNOSIS — Z00.00 WELL ADULT EXAM: Primary | ICD-10-CM

## 2021-11-01 DIAGNOSIS — I10 ESSENTIAL HYPERTENSION: ICD-10-CM

## 2021-11-01 PROCEDURE — 90471 IMMUNIZATION ADMIN: CPT | Performed by: FAMILY MEDICINE

## 2021-11-01 PROCEDURE — 99396 PREV VISIT EST AGE 40-64: CPT | Performed by: FAMILY MEDICINE

## 2021-11-01 PROCEDURE — 90674 CCIIV4 VAC NO PRSV 0.5 ML IM: CPT | Performed by: FAMILY MEDICINE

## 2021-11-01 ASSESSMENT — PATIENT HEALTH QUESTIONNAIRE - PHQ9
SUM OF ALL RESPONSES TO PHQ QUESTIONS 1-9: 0
1. LITTLE INTEREST OR PLEASURE IN DOING THINGS: 0
SUM OF ALL RESPONSES TO PHQ9 QUESTIONS 1 & 2: 0
2. FEELING DOWN, DEPRESSED OR HOPELESS: 0
SUM OF ALL RESPONSES TO PHQ QUESTIONS 1-9: 0
SUM OF ALL RESPONSES TO PHQ QUESTIONS 1-9: 0

## 2021-11-01 NOTE — PROGRESS NOTES
No posterior oropharyngeal erythema. Eyes: Conjunctivae and EOM are normal. Pupils are equal, round, and reactive to light. Neck: Normal range of motion. Neck supple. No thyromegaly present. Cardiovascular: Normal rate, regular rhythm and normal heart sounds. No murmur heard. Pulmonary/Chest: Effort normal and breath sounds normal. He has no wheezes. Hehas no rales. Abdominal: Soft. Bowel sounds are normal. He exhibits no distension and no mass. There is no tenderness. There is no rebound and no guarding. Genitourinary/Anorectal:deferred  Musculoskeletal: Normal range of motion. He exhibits no edema or tenderness. Lymphadenopathy: He has no cervical adenopathy. Neurological: He is alert and oriented to person, place, and time. He has normal reflexes. Skin: Skin is warm and dry. No rash noted. Psychiatric: He has a normal mood and affect. His   behavior is normal.       Assessment:      1. Well adult exam    2. Need for vaccination    3. Pure hypercholesterolemia    4. Essential hypertension    5. Cerebrovascular accident (CVA), unspecified mechanism ([facility] [address]    6. Malignant neoplasm of urinary bladder, unspecified site [facility])          Plan:      Call or return to clinic prn if these symptoms worsen or fail to improve as anticipated. I have reviewed the instructions with the patient, answering all questions to his satisfaction. Return if symptoms worsen or fail to improve. Orders Placed This Encounter   Procedures    INFLUENZA, MDCK QUADV, 2 YRS AND OLDER, IM, PF, PREFILL SYR OR SDV, 0.5ML (FLUCELVAX QUADV, PF)     No orders of the defined types were placed in this encounter.     Continue with all current medications and following with specialist  And he was given an influenza vaccine he should get a pneumonia vaccine but he thinks he is already had 1  Electronically signed by Reed Nicole DO on 11/1/2021 at 9:44 AM

## 2022-04-13 ENCOUNTER — OFFICE VISIT (OUTPATIENT)
Dept: FAMILY MEDICINE CLINIC | Age: 62
End: 2022-04-13
Payer: COMMERCIAL

## 2022-04-13 VITALS
HEIGHT: 69 IN | DIASTOLIC BLOOD PRESSURE: 72 MMHG | WEIGHT: 197.6 LBS | OXYGEN SATURATION: 94 % | BODY MASS INDEX: 29.27 KG/M2 | HEART RATE: 83 BPM | SYSTOLIC BLOOD PRESSURE: 114 MMHG

## 2022-04-13 DIAGNOSIS — C67.9 MALIGNANT NEOPLASM OF URINARY BLADDER, UNSPECIFIED SITE (HCC): ICD-10-CM

## 2022-04-13 DIAGNOSIS — M25.512 ACUTE PAIN OF LEFT SHOULDER: Primary | ICD-10-CM

## 2022-04-13 PROCEDURE — 99213 OFFICE O/P EST LOW 20 MIN: CPT | Performed by: FAMILY MEDICINE

## 2022-04-13 RX ORDER — MONTELUKAST SODIUM 4 MG/1
TABLET, CHEWABLE ORAL
COMMUNITY
Start: 2022-04-06

## 2022-04-13 SDOH — ECONOMIC STABILITY: FOOD INSECURITY: WITHIN THE PAST 12 MONTHS, YOU WORRIED THAT YOUR FOOD WOULD RUN OUT BEFORE YOU GOT MONEY TO BUY MORE.: NEVER TRUE

## 2022-04-13 SDOH — ECONOMIC STABILITY: FOOD INSECURITY: WITHIN THE PAST 12 MONTHS, THE FOOD YOU BOUGHT JUST DIDN'T LAST AND YOU DIDN'T HAVE MONEY TO GET MORE.: NEVER TRUE

## 2022-04-13 ASSESSMENT — PATIENT HEALTH QUESTIONNAIRE - PHQ9
2. FEELING DOWN, DEPRESSED OR HOPELESS: 0
SUM OF ALL RESPONSES TO PHQ QUESTIONS 1-9: 0
1. LITTLE INTEREST OR PLEASURE IN DOING THINGS: 0
SUM OF ALL RESPONSES TO PHQ QUESTIONS 1-9: 0
SUM OF ALL RESPONSES TO PHQ9 QUESTIONS 1 & 2: 0
SUM OF ALL RESPONSES TO PHQ QUESTIONS 1-9: 0
SUM OF ALL RESPONSES TO PHQ QUESTIONS 1-9: 0

## 2022-04-13 ASSESSMENT — SOCIAL DETERMINANTS OF HEALTH (SDOH): HOW HARD IS IT FOR YOU TO PAY FOR THE VERY BASICS LIKE FOOD, HOUSING, MEDICAL CARE, AND HEATING?: NOT HARD AT ALL

## 2022-04-13 NOTE — PROGRESS NOTES
[facility]  [address] [place] [address] Dr ZELAYA [address] [facility] [address]  Dept: [phone]      Yadira Blanc is a 64 y.o. male who presents today for follow up on his  medical conditions as noted below. Chief Complaint   Patient presents with    Shoulder Pain     left        Patient Active Problem List:     Seasonal allergies     Strep pharyngitis     BP (high blood pressure)     Cerebrovascular accident (CVA) ([facility]     Ulcerative colitis ([facility]     Past Medical History:   Diagnosis Date    BP (high blood pressure) 4/18/2017    Fatty liver     Seasonal allergies     Stroke [facility])       Past Surgical History:   Procedure Laterality Date    TONSILLECTOMY       Family History   Problem Relation Age of Onset    Cancer Mother        Current Outpatient Medications   Medication Sig Dispense Refill    colestipol (COLESTID) 1 g tablet take 1 tablet by mouth once daily (TAKE 2 HOURS APART FROM OTHER MEDICATIONS)      diclofenac sodium (VOLTAREN) 1 % GEL Apply 4 g topically 4 times daily as needed for Pain 200 g 2    lisinopril (PRINIVIL;ZESTRIL) 5 MG tablet take 1 tablet by mouth once daily      metoprolol succinate (TOPROL XL) 25 MG extended release tablet take 1 tablet by mouth once daily      Vedolizumab (ENTYVIO IV) Infuse intravenously      levocetirizine (XYZAL) 5 MG tablet take 1 tablet by mouth every evening 30 tablet 11    rosuvastatin (CRESTOR) 20 MG tablet Take 1 tablet by mouth nightly (Patient not taking: Reported on 4/13/2022) 90 tablet 3    ALREX 0.2 % SUSP  (Patient not taking: Reported on 11/1/2021)  0     No current facility-administered medications for this visit.      ALLERGIES:    Allergies   Allergen Reactions    Clindamycin/Lincomycin Diarrhea    Seasonal        Social History     Tobacco Use    Smoking status: Never Smoker    Smokeless tobacco: Never Used   Substance Use Topics    Alcohol use: No        LDL Calculated (mg/dL)   Date Value   12/11/2020 83     LDL Cholesterol (mg/dL)   Date Value   06/23/2016 96     HDL (mg/dL)   Date Value   12/11/2020 31 (A)     BUN (mg/dL)   Date Value   06/23/2016 19     CREATININE (mg/dL)   Date Value   06/23/2016 0.89     Glucose (mg/dL)   Date Value   06/23/2016 95              Subjective:      HPI    Here today stating just over the last day or 2 he developed left shoulder pain he went from doing pretty much no physical activity to join a softball league and had his first practice and was throwing and hitting and then developed shoulder pain he did state about 2 weeks ago his the back of his pickup truck and catching himself on his arms but did not have any shoulder pain at that time he did ice a little but has not done anything else he does have good range of motion    Review of Systems:     Constitutional: Negative for fever, appetite change and fatigue. Family social and medical history reviewed and unchanged     HENT: Negative. Negative for nosebleeds, trouble swallowing and neck pain. Eyes: Negative for photophobia and visual disturbance. Respiratory: Negative. Negative for chest tightness and shortness of breath. Cardiovascular: Negative. Negative for chest pain and leg swelling. Gastrointestinal: Negative. Negative for abdominal pain and blood in stool. Endocrine: Negative for cold intolerance and polyuria. Genitourinary: Negative for dysuria and hematuria. Musculoskeletal: Negative. Skin: Negative for rash. Allergic/Immunologic: Negative. Neurological: Negative. Negative for dizziness, weakness and numbness. Hematological: Negative. Negative for adenopathy. Does not bruise/bleed easily. Psychiatric/Behavioral: Negative for sleep disturbance, dysphoric mood and  decreased concentration. The patient is not nervous/anxious. Objective:     Physical Exam:     Nursing note and vitals reviewed.   /72   Pulse 83   Ht 5' 9\" (1.753 m)   Wt 197 lb 9.6 oz (89.6 kg)   SpO2 94%   BMI 29.18 kg/m²   Constitutional: He is oriented to person, place, and time. He   appears well-developed and well-nourished. HENT:   Head: Normocephalic and atraumatic. Right Ear: External ear normal. Tympanic membrane is not erythematous. No middle ear effusion. Left Ear: External ear normal. Tympanic membrane is not erythematous. No middle ear effusion. Nose: No mucosal edema. Mouth/Throat: Oropharynx is clear and moist. No posterior oropharyngeal erythema. Eyes: Conjunctivae and EOM are normal. Pupils are equal, round, and reactive to light. Neck: Normal range of motion. Neck supple. No thyromegaly present. Cardiovascular: Normal rate, regular rhythm and normal heart sounds. No murmur heard. Pulmonary/Chest: Effort normal and breath sounds normal. He has no wheezes. Hehas no rales. Abdominal: Soft. Bowel sounds are normal. He exhibits no distension and no mass. There is no tenderness. There is no rebound and no guarding. Genitourinary/Anorectal:deferred  Musculoskeletal: Normal range of motion. He exhibits no edema or mild tenderness. left trape and  a little in the ac area left shoulder   Lymphadenopathy: He has no cervical adenopathy. Neurological: He is alert and oriented to person, place, and time. He has normal reflexes. Skin: Skin is warm and dry. No rash noted. Psychiatric: He has a normal mood and affect. His   behavior is normal.       Assessment:      1. Acute pain of left shoulder          Plan:      Call or return to clinic prn if these symptoms worsen or fail to improve as anticipated. I have reviewed the instructions with the patient, answering all questions to his satisfaction. No follow-ups on file.   Orders Placed This Encounter   Procedures    XR SHOULDER LEFT (MIN 2 VIEWS)     Standing Status:   Future     Standing Expiration Date:   4/13/2023     Order Specific Question:   Reason for exam:     Answer:   pain     Orders Placed This Encounter   Medications    diclofenac sodium (VOLTAREN) 1 % GEL     Sig: Apply 4 g topically 4 times daily as needed for Pain     Dispense:  200 g     Refill:  2     He needs to go online and look up some shoulder exercises start doing some gradual weight lifting do more stretching uses pain as a guide as to how much he can hit and throw and going to a little more gingerly  Electronically signed by Nicolas Harper DO on 4/13/2022 at 4:23 PM

## 2022-04-14 DIAGNOSIS — M25.512 ACUTE PAIN OF LEFT SHOULDER: ICD-10-CM

## 2022-04-20 DIAGNOSIS — E78.00 PURE HYPERCHOLESTEROLEMIA: ICD-10-CM

## 2022-04-20 RX ORDER — ROSUVASTATIN CALCIUM 20 MG/1
TABLET, COATED ORAL
Qty: 90 TABLET | Refills: 3 | Status: SHIPPED | OUTPATIENT
Start: 2022-04-20

## 2022-04-20 NOTE — TELEPHONE ENCOUNTER
Pharm requested    Health Maintenance   Topic Date Due    Hepatitis C screen  Never done    Diabetes screen  Never done    Prostate Specific Antigen (PSA) Screening or Monitoring  06/05/2019    COVID-19 Vaccine (3 - Booster for Pfizer series) 09/20/2021    Lipids  12/11/2021    Potassium  12/11/2021    Creatinine  12/11/2021    DTaP/Tdap/Td vaccine (2 - Td or Tdap) 06/19/2022    Depression Screen  04/13/2023    Colorectal Cancer Screen  03/04/2026    Flu vaccine  Completed    Shingles Vaccine  Completed    HIV screen  Completed    Hepatitis A vaccine  Aged Out    Hepatitis B vaccine  Aged Out    Hib vaccine  Aged Out    Meningococcal (ACWY) vaccine  Aged Out    Pneumococcal 0-64 years Vaccine  Aged Out             (applicable per patient's age: Cancer Screenings, Depression Screening, Fall Risk Screening, Immunizations)    LDL Cholesterol (mg/dL)   Date Value   06/23/2016 96     LDL Calculated (mg/dL)   Date Value   12/11/2020 83     AST (U/L)   Date Value   06/23/2016 22     ALT (U/L)   Date Value   06/23/2016 48 (H)     BUN (mg/dL)   Date Value   06/23/2016 19      (goal A1C is < 7)   (goal LDL is <100) need 30-50% reduction from baseline     BP Readings from Last 3 Encounters:   04/13/22 114/72   11/01/21 115/76   12/02/19 (!) 154/91    (goal /80)      All Future Testing planned in [facility]:  Lab Frequency Next Occurrence       Next Visit Date:  No future appointments.          Patient Active Problem List:     Seasonal allergies     Strep pharyngitis     BP (high blood pressure)     Cerebrovascular accident (CVA) ([address]     Ulcerative colitis ([address]

## 2022-11-02 ENCOUNTER — TELEPHONE (OUTPATIENT)
Dept: FAMILY MEDICINE CLINIC | Age: 62
End: 2022-11-02

## 2022-11-02 NOTE — TELEPHONE ENCOUNTER
----- Message from Chang Himanshulawong sent at 11/2/2022  1:25 PM EDT -----  Subject: Message to Provider    QUESTIONS  Information for Provider? Patient made a pe for Nov 14th and does he need   to get blood work done ahead of time and call him when ready . He goes to   a non [facility] lab. cb pt   ---------------------------------------------------------------------------  --------------  Allison ROCK  [phone]; OK to leave message on voicemail  ---------------------------------------------------------------------------  --------------  SCRIPT ANSWERS  Relationship to Patient?  Self

## 2022-11-02 NOTE — TELEPHONE ENCOUNTER
----- Message from Argenis Perez sent at 11/2/2022  1:25 PM EDT -----  Subject: Message to Provider    QUESTIONS  Information for Provider? Patient made a pe for Nov 14th and does he need   to get blood work done ahead of time and call him when ready . He goes to   a non [facility] lab. cb pt   ---------------------------------------------------------------------------  --------------  Reed Rg INFO  [phone]; OK to leave message on voicemail  ---------------------------------------------------------------------------  --------------  SCRIPT ANSWERS  Relationship to Patient?  Self

## 2022-11-14 ENCOUNTER — HOSPITAL ENCOUNTER (OUTPATIENT)
Age: 62
Setting detail: SPECIMEN
Discharge: HOME | End: 2022-11-14

## 2022-11-14 ENCOUNTER — OFFICE VISIT (OUTPATIENT)
Dept: FAMILY MEDICINE CLINIC | Age: 62
End: 2022-11-14
Payer: COMMERCIAL

## 2022-11-14 VITALS
SYSTOLIC BLOOD PRESSURE: 138 MMHG | HEART RATE: 68 BPM | HEIGHT: 69 IN | OXYGEN SATURATION: 97 % | BODY MASS INDEX: 28.88 KG/M2 | DIASTOLIC BLOOD PRESSURE: 86 MMHG | WEIGHT: 195 LBS

## 2022-11-14 DIAGNOSIS — C61 PROSTATE CANCER (HCC): ICD-10-CM

## 2022-11-14 DIAGNOSIS — I10 ESSENTIAL HYPERTENSION: ICD-10-CM

## 2022-11-14 DIAGNOSIS — Z00.00 WELL ADULT EXAM: ICD-10-CM

## 2022-11-14 DIAGNOSIS — K51.011 ULCERATIVE PANCOLITIS WITH RECTAL BLEEDING (HCC): ICD-10-CM

## 2022-11-14 DIAGNOSIS — E78.00 PURE HYPERCHOLESTEROLEMIA: ICD-10-CM

## 2022-11-14 DIAGNOSIS — N52.01 ERECTILE DYSFUNCTION DUE TO ARTERIAL INSUFFICIENCY: ICD-10-CM

## 2022-11-14 DIAGNOSIS — Z00.00 WELL ADULT EXAM: Primary | ICD-10-CM

## 2022-11-14 LAB
ABSOLUTE EOS #: 0.12 K/UL (ref 0–0.44)
ABSOLUTE IMMATURE GRANULOCYTE: <0.03 K/UL (ref 0–0.3)
ABSOLUTE LYMPH #: 1.12 K/UL (ref 1.1–3.7)
ABSOLUTE MONO #: 0.41 K/UL (ref 0.1–1.2)
ALBUMIN SERPL-MCNC: 4.4 G/DL (ref 3.5–5.2)
ALBUMIN/GLOBULIN RATIO: 1.8 (ref 1–2.5)
ALP BLD-CCNC: 84 U/L (ref 40–129)
ALT SERPL-CCNC: 40 U/L (ref 5–41)
ANION GAP SERPL CALCULATED.3IONS-SCNC: 11 MMOL/L (ref 9–17)
AST SERPL-CCNC: 27 U/L
BASOPHILS # BLD: 1 % (ref 0–2)
BASOPHILS ABSOLUTE: 0.04 K/UL (ref 0–0.2)
BILIRUB SERPL-MCNC: 0.6 MG/DL (ref 0.3–1.2)
BUN BLDV-MCNC: 10 MG/DL (ref 8–23)
CALCIUM SERPL-MCNC: 9.5 MG/DL (ref 8.6–10.4)
CHLORIDE BLD-SCNC: 101 MMOL/L (ref 98–107)
CHOLESTEROL/HDL RATIO: 5.4
CHOLESTEROL: 204 MG/DL
CO2: 27 MMOL/L (ref 20–31)
CREAT SERPL-MCNC: 0.91 MG/DL (ref 0.7–1.2)
EOSINOPHILS RELATIVE PERCENT: 2 % (ref 1–4)
GFR SERPL CREATININE-BSD FRML MDRD: >60 ML/MIN/1.73M2
GLUCOSE BLD-MCNC: 94 MG/DL (ref 70–99)
HCT VFR BLD CALC: 47.2 % (ref 40.7–50.3)
HDLC SERPL-MCNC: 38 MG/DL
HEMOGLOBIN: 15.3 G/DL (ref 13–17)
IMMATURE GRANULOCYTES: 0 %
LDL CHOLESTEROL: 127 MG/DL (ref 0–130)
LYMPHOCYTES # BLD: 21 % (ref 24–43)
MCH RBC QN AUTO: 30.6 PG (ref 25.2–33.5)
MCHC RBC AUTO-ENTMCNC: 32.4 G/DL (ref 28.4–34.8)
MCV RBC AUTO: 94.4 FL (ref 82.6–102.9)
MONOCYTES # BLD: 8 % (ref 3–12)
NRBC AUTOMATED: 0 PER 100 WBC
PDW BLD-RTO: 13.4 % (ref 11.8–14.4)
PLATELET # BLD: 218 K/UL (ref 138–453)
PMV BLD AUTO: 10.4 FL (ref 8.1–13.5)
POTASSIUM SERPL-SCNC: 4.7 MMOL/L (ref 3.7–5.3)
RBC # BLD: 5 M/UL (ref 4.21–5.77)
SEG NEUTROPHILS: 68 % (ref 36–65)
SEGMENTED NEUTROPHILS ABSOLUTE COUNT: 3.54 K/UL (ref 1.5–8.1)
SODIUM BLD-SCNC: 139 MMOL/L (ref 135–144)
THYROXINE, FREE: 1.07 NG/DL (ref 0.93–1.7)
TOTAL PROTEIN: 6.9 G/DL (ref 6.4–8.3)
TRIGL SERPL-MCNC: 197 MG/DL
TSH SERPL DL<=0.05 MIU/L-ACNC: 2.06 UIU/ML (ref 0.3–5)
WBC # BLD: 5.3 K/UL (ref 3.5–11.3)

## 2022-11-14 PROCEDURE — 3074F SYST BP LT 130 MM HG: CPT | Performed by: FAMILY MEDICINE

## 2022-11-14 PROCEDURE — 99396 PREV VISIT EST AGE 40-64: CPT | Performed by: FAMILY MEDICINE

## 2022-11-14 PROCEDURE — 3078F DIAST BP <80 MM HG: CPT | Performed by: FAMILY MEDICINE

## 2022-11-14 RX ORDER — TADALAFIL 5 MG/1
TABLET ORAL
Qty: 30 TABLET | Refills: 11 | Status: SHIPPED | OUTPATIENT
Start: 2022-11-14

## 2022-11-14 NOTE — PROGRESS NOTES
[facility]  [address] [place] [address] Dr ZELAYA [address] [address]  Dept: [phone]      Jaimee Wolfe is a 58 y.o. male who presents today for follow up on his  medical conditions as noted below. Chief Complaint   Patient presents with    Annual Exam       Patient Active Problem List:     Seasonal allergies     Strep pharyngitis     BP (high blood pressure)     Cerebrovascular accident (CVA) ([facility]     Ulcerative colitis ([facility] [address]     Past Medical History:   Diagnosis Date    BP (high blood pressure) 4/18/2017    Fatty liver     Seasonal allergies     Stroke [facility])       Past Surgical History:   Procedure Laterality Date    TONSILLECTOMY       Family History   Problem Relation Age of Onset    Cancer Mother        Current Outpatient Medications   Medication Sig Dispense Refill    tadalafil (CIALIS) 5 MG tablet take 1 tablet by mouth once daily if needed 30 tablet 11    colestipol (COLESTID) 1 g tablet take 1 tablet by mouth once daily (TAKE 2 HOURS APART FROM OTHER MEDICATIONS)      diclofenac sodium (VOLTAREN) 1 % GEL Apply 4 g topically 4 times daily as needed for Pain 200 g 2    lisinopril (PRINIVIL;ZESTRIL) 5 MG tablet take 1 tablet by mouth once daily      metoprolol succinate (TOPROL XL) 25 MG extended release tablet take 1 tablet by mouth once daily      Vedolizumab (ENTYVIO IV) Infuse intravenously      levocetirizine (XYZAL) 5 MG tablet take 1 tablet by mouth every evening 30 tablet 11    rosuvastatin (CRESTOR) 20 MG tablet take 1 tablet by mouth every evening (Patient not taking: Reported on 11/14/2022) 90 tablet 3    ALREX 0.2 % SUSP  (Patient not taking: No sig reported)  0     No current facility-administered medications for this visit.      ALLERGIES:    Allergies   Allergen Reactions    Clindamycin/Lincomycin Diarrhea    Seasonal        Social History     Tobacco Use    Smoking status: Never    Smokeless tobacco: Never   Substance Use Topics Alcohol use: No        LDL Calculated (mg/dL)   Date Value   12/11/2020 83     LDL Cholesterol (mg/dL)   Date Value   06/23/2016 96     HDL (mg/dL)   Date Value   12/11/2020 31 (A)     BUN (mg/dL)   Date Value   06/23/2016 19     Creatinine (mg/dL)   Date Value   06/23/2016 0.89     Glucose (mg/dL)   Date Value   06/23/2016 95              Subjective:      HPI  He is being seen today for well visit overall he is doing fine he continues to follow with all of his specialist in regards to his prostate cancer and his Ulcertaive colitis  he is continuing on IV infusion therapy and he will he is otherwise having no new issues he is due for laboratory studies    Review of Systems:     Constitutional: Negative for fever, appetite change and fatigue. Family social and medical history reviewed and unchanged     HENT: Negative. Negative for nosebleeds, trouble swallowing and neck pain. Eyes: Negative for photophobia and visual disturbance. Respiratory: Negative. Negative for chest tightness and shortness of breath. Cardiovascular: Negative. Negative for chest pain and leg swelling. Gastrointestinal: Negative. Negative for abdominal pain and blood in stool. Endocrine: Negative for cold intolerance and polyuria. Genitourinary: Negative for dysuria and hematuria. Musculoskeletal: Negative. Skin: Negative for rash. Allergic/Immunologic: Negative. Neurological: Negative. Negative for dizziness, weakness and numbness. Hematological: Negative. Negative for adenopathy. Does not bruise/bleed easily. Psychiatric/Behavioral: Negative for sleep disturbance, dysphoric mood and  decreased concentration. The patient is not nervous/anxious. Objective:     Physical Exam:     Nursing note and vitals reviewed. /86   Pulse 68   Ht 5' 9\" (1.753 m)   Wt 195 lb (88.5 kg)   SpO2 97%   BMI 28.80 kg/m²   Constitutional: He is oriented to person, place, and time.  He   appears well-developed and well-nourished. HENT:   Head: Normocephalic and atraumatic. Right Ear: External ear normal. Tympanic membrane is not erythematous. No middle ear effusion. Left Ear: External ear normal. Tympanic membrane is not erythematous. No middle ear effusion. Nose: No mucosal edema. Mouth/Throat: Oropharynx is clear and moist. No posterior oropharyngeal erythema. Eyes: Conjunctivae and EOM are normal. Pupils are equal, round, and reactive to light. Neck: Normal range of motion. Neck supple. No thyromegaly present. Cardiovascular: Normal rate, regular rhythm and normal heart sounds. No murmur heard. Pulmonary/Chest: Effort normal and breath sounds normal. He has no wheezes. Hehas no rales. Abdominal: Soft. Bowel sounds are normal. He exhibits no distension and no mass. There is no tenderness. There is no rebound and no guarding. Genitourinary/Anorectal:deferred  Musculoskeletal: Normal range of motion. He exhibits no edema or tenderness. Lymphadenopathy: He has no cervical adenopathy. Neurological: He is alert and oriented to person, place, and time. He has normal reflexes. Skin: Skin is warm and dry. No rash noted. Psychiatric: He has a normal mood and affect. His   behavior is normal.       Assessment:      1. Well adult exam    2. Essential hypertension    3. Pure hypercholesterolemia    4. Prostate cancer ([address]    5. Ulcerative pancolitis with rectal bleeding ([address]    6. Erectile dysfunction due to arterial insufficiency          Plan:      Call or return to clinic prn if these symptoms worsen or fail to improve as anticipated. I have reviewed the instructions with the patient, answering all questions to his satisfaction. No follow-ups on file.   Orders Placed This Encounter   Procedures    CBC with Auto Differential     Standing Status:   Future     Standing Expiration Date:   5/14/2023    Comprehensive Metabolic Panel     Standing Status:   Future     Standing Expiration Date: 5/14/2023    Lipid Panel     Standing Status:   Future     Standing Expiration Date:   12/14/2022     Order Specific Question:   Is Patient Fasting?/# of Hours     Answer:   Yes    T4, Free     Standing Status:   Future     Standing Expiration Date:   5/14/2023    TSH     Standing Status:   Future     Standing Expiration Date:   5/14/2023     Orders Placed This Encounter   Medications    tadalafil (CIALIS) 5 MG tablet     Sig: take 1 tablet by mouth once daily if needed     Dispense:  30 tablet     Refill:  11   Fu prn     Electronically signed by Elijah Samson DO on 11/14/2022 at 1:43 PM

## 2023-02-03 ENCOUNTER — OFFICE VISIT (OUTPATIENT)
Dept: FAMILY MEDICINE CLINIC | Age: 63
End: 2023-02-03
Payer: COMMERCIAL

## 2023-02-03 VITALS
DIASTOLIC BLOOD PRESSURE: 76 MMHG | OXYGEN SATURATION: 98 % | BODY MASS INDEX: 29.62 KG/M2 | HEART RATE: 64 BPM | HEIGHT: 69 IN | WEIGHT: 200 LBS | SYSTOLIC BLOOD PRESSURE: 140 MMHG

## 2023-02-03 DIAGNOSIS — M25.512 ACUTE PAIN OF LEFT SHOULDER: Primary | ICD-10-CM

## 2023-02-03 PROCEDURE — 3078F DIAST BP <80 MM HG: CPT | Performed by: FAMILY MEDICINE

## 2023-02-03 PROCEDURE — 3077F SYST BP >= 140 MM HG: CPT | Performed by: FAMILY MEDICINE

## 2023-02-03 PROCEDURE — 99213 OFFICE O/P EST LOW 20 MIN: CPT | Performed by: FAMILY MEDICINE

## 2023-02-03 SDOH — ECONOMIC STABILITY: HOUSING INSECURITY
IN THE LAST 12 MONTHS, WAS THERE A TIME WHEN YOU DID NOT HAVE A STEADY PLACE TO SLEEP OR SLEPT IN A SHELTER (INCLUDING NOW)?: NO

## 2023-02-03 SDOH — ECONOMIC STABILITY: FOOD INSECURITY: WITHIN THE PAST 12 MONTHS, THE FOOD YOU BOUGHT JUST DIDN'T LAST AND YOU DIDN'T HAVE MONEY TO GET MORE.: NEVER TRUE

## 2023-02-03 SDOH — ECONOMIC STABILITY: FOOD INSECURITY: WITHIN THE PAST 12 MONTHS, YOU WORRIED THAT YOUR FOOD WOULD RUN OUT BEFORE YOU GOT MONEY TO BUY MORE.: NEVER TRUE

## 2023-02-03 SDOH — ECONOMIC STABILITY: INCOME INSECURITY: HOW HARD IS IT FOR YOU TO PAY FOR THE VERY BASICS LIKE FOOD, HOUSING, MEDICAL CARE, AND HEATING?: NOT HARD AT ALL

## 2023-02-03 ASSESSMENT — PATIENT HEALTH QUESTIONNAIRE - PHQ9
2. FEELING DOWN, DEPRESSED OR HOPELESS: 0
SUM OF ALL RESPONSES TO PHQ QUESTIONS 1-9: 0
SUM OF ALL RESPONSES TO PHQ QUESTIONS 1-9: 0
SUM OF ALL RESPONSES TO PHQ9 QUESTIONS 1 & 2: 0
1. LITTLE INTEREST OR PLEASURE IN DOING THINGS: 0
SUM OF ALL RESPONSES TO PHQ QUESTIONS 1-9: 0
SUM OF ALL RESPONSES TO PHQ QUESTIONS 1-9: 0

## 2023-02-03 NOTE — PROGRESS NOTES
[facility]  [address] [place] [address] Dr ZELAYA [address] [facility] [address]  Dept: [phone]      Raquel Rodriguez is a [address] [place] [address] y.o. male who presents today for follow up on his  medical conditions as noted below. Chief Complaint   Patient presents with    Shoulder Pain     left       Patient Active Problem List:     Seasonal allergies     Strep pharyngitis     BP (high blood pressure)     Cerebrovascular accident (CVA) ([facility] [address]     Ulcerative colitis ([facility] [address]     Past Medical History:   Diagnosis Date    BP (high blood pressure) 4/18/2017    Fatty liver     Seasonal allergies     Stroke [facility])       Past Surgical History:   Procedure Laterality Date    TONSILLECTOMY       Family History   Problem Relation Age of Onset    Cancer Mother        Current Outpatient Medications   Medication Sig Dispense Refill    tadalafil (CIALIS) 5 MG tablet take 1 tablet by mouth once daily if needed 30 tablet 11    colestipol (COLESTID) 1 g tablet take 1 tablet by mouth once daily (TAKE 2 HOURS APART FROM OTHER MEDICATIONS)      diclofenac sodium (VOLTAREN) 1 % GEL Apply 4 g topically 4 times daily as needed for Pain 200 g 2    lisinopril (PRINIVIL;ZESTRIL) 5 MG tablet take 1 tablet by mouth once daily      metoprolol succinate (TOPROL XL) 25 MG extended release tablet take 1 tablet by mouth once daily      Vedolizumab (ENTYVIO IV) Infuse intravenously      levocetirizine (XYZAL) 5 MG tablet take 1 tablet by mouth every evening 30 tablet 11    rosuvastatin (CRESTOR) 20 MG tablet take 1 tablet by mouth every evening (Patient not taking: No sig reported) 90 tablet 3    ALREX 0.2 % SUSP  (Patient not taking: No sig reported)  0     No current facility-administered medications for this visit.      ALLERGIES:    Allergies   Allergen Reactions    Clindamycin/Lincomycin Diarrhea    Seasonal        Social History     Tobacco Use    Smoking status: Never    Smokeless tobacco: Never   Substance Use Topics Alcohol use: No        LDL Calculated (mg/dL)   Date Value   12/11/2020 83     LDL Cholesterol (mg/dL)   Date Value   11/14/2022 127     HDL (mg/dL)   Date Value   11/14/2022 38 (L)     BUN (mg/dL)   Date Value   11/14/2022 10     Creatinine (mg/dL)   Date Value   11/14/2022 0.91     Glucose (mg/dL)   Date Value   11/14/2022 94              Subjective:      HPI  He is here today for follow-up on his ongoing shoulder pain he states that it is getting worse he did do some exercises that were given to him from a cousin who is a physical therapist he has tried anti-inflammatories lifting and sleeping makes it much worse  Originally started after he started playing softball and was throwing    Review of Systems:     Constitutional: Negative for fever, appetite change and fatigue. Family social and medical history reviewed and unchanged     HENT: Negative. Negative for nosebleeds, trouble swallowing and neck pain. Eyes: Negative for photophobia and visual disturbance. Respiratory: Negative. Negative for chest tightness and shortness of breath. Cardiovascular: Negative. Negative for chest pain and leg swelling. Gastrointestinal: Negative. Negative for abdominal pain and blood in stool. Endocrine: Negative for cold intolerance and polyuria. Genitourinary: Negative for dysuria and hematuria. Musculoskeletal: Negative. Skin: Negative for rash. Allergic/Immunologic: Negative. Neurological: Negative. Negative for dizziness, weakness and numbness. Hematological: Negative. Negative for adenopathy. Does not bruise/bleed easily. Psychiatric/Behavioral: Negative for sleep disturbance, dysphoric mood and  decreased concentration. The patient is not nervous/anxious. Objective:     Physical Exam:     Nursing note and vitals reviewed.   BP (!) 140/76   Pulse 64   Ht 5' 9\" (1.753 m)   Wt 200 lb (90.7 kg)   SpO2 98%   BMI 29.53 kg/m²   Constitutional: He is oriented to person, place, and time. He   appears well-developed and well-nourished. HENT:   Head: Normocephalic and atraumatic. Right Ear: External ear normal. Tympanic membrane is not erythematous. No middle ear effusion. Left Ear: External ear normal. Tympanic membrane is not erythematous. No middle ear effusion. Nose: No mucosal edema. Mouth/Throat: Oropharynx is clear and moist. No posterior oropharyngeal erythema. Eyes: Conjunctivae and EOM are normal. Pupils are equal, round, and reactive to light. Neck: Normal range of motion. Neck supple. No thyromegaly present. Cardiovascular: Normal rate, regular rhythm and normal heart sounds. No murmur heard. Pulmonary/Chest: Effort normal and breath sounds normal. He has no wheezes. Hehas no rales. Abdominal: Soft. Bowel sounds are normal. He exhibits no distension and no mass. There is no tenderness. There is no rebound and no guarding. Genitourinary/Anorectal:deferred  Musculoskeletal: some decreased range of motion. left shoulder  He exhibits no edema or tenderness. Slightly weaker   Lymphadenopathy: He has no cervical adenopathy. Neurological: He is alert and oriented to person, place, and time. He has normal reflexes. Skin: Skin is warm and dry. No rash noted. Psychiatric: He has a normal mood and affect. His   behavior is normal.       Assessment:      1. Acute pain of left shoulder          Plan:      Call or return to clinic prn if these symptoms worsen or fail to improve as anticipated. I have reviewed the instructions with the patient, answering all questions to his satisfaction. No follow-ups on file.   Orders Placed This Encounter   Procedures    MRI SHOULDER LEFT WO CONTRAST     Standing Status:   Future     Standing Expiration Date:   2/3/2024    Kirstin Sethi MD, Orthopedic Surgery, [place]     Referral Priority:   Routine     Referral Type:   Eval and Treat     Referral Reason:   Specialty Services Required     Referred to Provider: Shamar Oh MD     Requested Specialty:   Orthopedic Surgery     Number of Visits Requested:   1     No orders of the defined types were placed in this encounter.   I suggest that he make sure he is still doing range of motion exercising get the MRI and see orthopedics    Electronically signed by Ramon Noel DO on 2/3/2023 at 10:53 AM

## 2023-02-09 ENCOUNTER — HOSPITAL ENCOUNTER (OUTPATIENT)
Dept: MRI IMAGING | Facility: CLINIC | Age: 63
Discharge: HOME | End: 2023-02-11
Payer: COMMERCIAL

## 2023-02-09 DIAGNOSIS — M25.512 ACUTE PAIN OF LEFT SHOULDER: ICD-10-CM

## 2023-02-09 PROCEDURE — 73221 MRI JOINT UPR EXTREM W/O DYE: CPT

## 2023-02-13 ENCOUNTER — OFFICE VISIT (OUTPATIENT)
Dept: ORTHOPEDIC SURGERY | Age: 63
End: 2023-02-13
Payer: COMMERCIAL

## 2023-02-13 VITALS — BODY MASS INDEX: 29.47 KG/M2 | WEIGHT: 199 LBS | HEIGHT: 69 IN | RESPIRATION RATE: 16 BRPM

## 2023-02-13 DIAGNOSIS — M75.102 ROTATOR CUFF SYNDROME OF LEFT SHOULDER: ICD-10-CM

## 2023-02-13 DIAGNOSIS — M75.101 ROTATOR CUFF SYNDROME, RIGHT: Primary | ICD-10-CM

## 2023-02-13 PROCEDURE — 20611 DRAIN/INJ JOINT/BURSA W/US: CPT | Performed by: PHYSICIAN ASSISTANT

## 2023-02-13 PROCEDURE — 99204 OFFICE O/P NEW MOD 45 MIN: CPT | Performed by: PHYSICIAN ASSISTANT

## 2023-02-13 RX ORDER — TRIAMCINOLONE ACETONIDE 40 MG/ML
40 INJECTION, SUSPENSION INTRA-ARTICULAR; INTRAMUSCULAR ONCE
Status: COMPLETED | OUTPATIENT
Start: 2023-02-13 | End: 2023-02-13

## 2023-02-13 RX ORDER — LIDOCAINE HYDROCHLORIDE 10 MG/ML
4 INJECTION, SOLUTION INFILTRATION; PERINEURAL ONCE
Status: COMPLETED | OUTPATIENT
Start: 2023-02-13 | End: 2023-02-13

## 2023-02-13 RX ADMIN — TRIAMCINOLONE ACETONIDE 40 MG: 40 INJECTION, SUSPENSION INTRA-ARTICULAR; INTRAMUSCULAR at 14:45

## 2023-02-13 RX ADMIN — LIDOCAINE HYDROCHLORIDE 4 ML: 10 INJECTION, SOLUTION INFILTRATION; PERINEURAL at 14:45

## 2023-02-13 ASSESSMENT — ENCOUNTER SYMPTOMS
APNEA: 0
NAUSEA: 0
DIARRHEA: 0
ABDOMINAL DISTENTION: 0
SHORTNESS OF BREATH: 0
CONSTIPATION: 0
COLOR CHANGE: 0
CHEST TIGHTNESS: 0
RESPIRATORY NEGATIVE: 1
VOMITING: 0
COUGH: 0
ABDOMINAL PAIN: 0

## 2023-02-13 NOTE — PROGRESS NOTES
[address] [facility]ICINE  [address]  [facility] [address]  Dept: [phone]  Dept Fax: [phone]        Left Shoulder - New Patient     Chief Complaint:     Chief Complaint   Patient presents with    Shoulder Pain     Left     HPI:     Khai Bolaños is a 58y.o. year old left hand dominant male that has had pain in the left shoulder for  18  months. As far as any trauma to the shoulder, the patient indicates no trauma except maybe catching a box that was falling. He works on his farm fixing buildings. The pain is worse at night and when doing overhead activities. Weakness of the shoulder has been noted. The pain restricts activities such as stretching, playing softball, sleeping. The pain does not seem to improve with time. The following interventions/medications have been tried exercises, anti-inflammatories, ice, heat. The patient has not had a corticosteroid injection. The patient has not tried physical therapy. The patient has not has surgery. The opposite shoulder is not okay. Neck pain has not been present. Patient was seen by his family doctor who ordered an MRI and x-rays of the left shoulder. He states he is also having pain in his right shoulder but his left is definitely worse. Review of Systems   Constitutional:  Positive for activity change. Negative for appetite change, fatigue and fever. Respiratory: Negative. Negative for apnea, cough, chest tightness and shortness of breath. Cardiovascular: Negative. Negative for chest pain, palpitations and leg swelling. Gastrointestinal:  Negative for abdominal distention, abdominal pain, constipation, diarrhea, nausea and vomiting. Genitourinary:  Negative for difficulty urinating, dysuria and hematuria. Musculoskeletal:  Positive for arthralgias. Negative for gait problem, joint swelling and myalgias.    Skin:  Negative for color change and rash. Neurological:  Negative for dizziness, weakness, numbness and headaches. Psychiatric/Behavioral:  Positive for sleep disturbance.       Past Medical History:    Past Medical History:   Diagnosis Date    BP (high blood pressure) 4/18/2017    Fatty liver     Seasonal allergies     Stroke [facility])        Past Surgical History:    Past Surgical History:   Procedure Laterality Date    TONSILLECTOMY         CurrentMedications:   Current Outpatient Medications   Medication Sig Dispense Refill    tadalafil (CIALIS) 5 MG tablet take 1 tablet by mouth once daily if needed 30 tablet 11    rosuvastatin (CRESTOR) 20 MG tablet take 1 tablet by mouth every evening (Patient not taking: No sig reported) 90 tablet 3    colestipol (COLESTID) 1 g tablet take 1 tablet by mouth once daily (TAKE 2 HOURS APART FROM OTHER MEDICATIONS)      diclofenac sodium (VOLTAREN) 1 % GEL Apply 4 g topically 4 times daily as needed for Pain 200 g 2    lisinopril (PRINIVIL;ZESTRIL) 5 MG tablet take 1 tablet by mouth once daily      metoprolol succinate (TOPROL XL) 25 MG extended release tablet take 1 tablet by mouth once daily      Vedolizumab (ENTYVIO IV) Infuse intravenously      levocetirizine (XYZAL) 5 MG tablet take 1 tablet by mouth every evening 30 tablet 11    ALREX 0.2 % SUSP  (Patient not taking: No sig reported)  0     Current Facility-Administered Medications   Medication Dose Route Frequency Provider Last Rate Last Admin    triamcinolone acetonide (KENALOG-40) injection 40 mg  40 mg Intra-artICUlar Once Kylah Mile, PA-C        lidocaine 1 % injection 4 mL  4 mL Intra-artICUlar Once Kylah Mile, PA-C        triamcinolone acetonide (KENALOG-40) injection 40 mg  40 mg Intra-artICUlar Once Kylah Mile, PA-C           Allergies:    Clindamycin/lincomycin and Seasonal    Social History:   Social History     Socioeconomic History    Marital status:      Spouse name: None    Number of children: None Years of education: None    Highest education level: None   Tobacco Use    Smoking status: Never    Smokeless tobacco: Never   Substance and Sexual Activity    Alcohol use: No    Drug use: No     Social Determinants of Health     Financial Resource Strain: Low Risk     Difficulty of Paying Living Expenses: Not hard at all   Food Insecurity: No Food Insecurity    Worried About Running Out of Food in the Last Year: Never true    Ran Out of Food in the Last Year: Never true   Transportation Needs: Unknown    Lack of Transportation (Non-Medical): No   Housing Stability: Unknown    Unstable Housing in the Last Year: No       Family History:  Family History   Problem Relation Age of Onset    Cancer Mother        I have reviewed the CC, HPI, ROS, PMH, FHX, Social History, and if not present in this note, I have reviewed in the patient's chart. I agree with the documentation provided by other staff and have reviewed their documentation prior to providing my signature indicating agreement. Vitals:   Resp 16   Ht 5' 9\" (1.753 m)   Wt 199 lb (90.3 kg)   BMI 29.39 kg/m²  Body mass index is 29.39 kg/m². Physical Examination:     Orthopedics:    GENERAL: Alert and oriented X3 in no acute distress. SKIN: Intact without lesions or ulcerations. NEURO: Musculoskeletal and axillary nerves intact to sensory and motor testing. VASC: Capillary refill is less than 3 seconds. Bilateral Shoulder Exam    GEN: Alert and oriented X 3, in no acute distress. SKIN: Intact without rashes, lesions, or ulcerations. NEURO: Musculoskeletal ans axillary nerves intact to sensory and motor testing. VASC: Cap refill less than than 3 secs. Negative Adson's test, Negative Marily's test.  ROM: 150 degrees of forward elevation, 50 degrees of external rotation in neutral, 90 degrees of external rotation in abduction, internal rotation to T12. STRENGTH: Supraspinatus 4/5, external rotators 5/5.     MUSC: No atrophy, negative subscap lift off or belly press test.  IMP: + Neer's sign, + Hawkin's sign, + Coracoid impingement, no painful arc, + pain with cross body abduction. PALP: no pain over anterolateral acromion, no pain over AC joint, no pain over traps/rhomboids. INST: mild Mason's test, + Speed's test  Assessment:     1. Rotator cuff syndrome, right    2. Rotator cuff syndrome of left shoulder      Procedures:    Procedure: yes    Subacromial Bursa Injection    Location: Bilateral Shoulder  Procedure: I discussed in detail the risks, benefits and complications of an injection which included but are not limited to infection, skin reactions, hot swollen joint and anaphylaxis with the patient. The patient verbalized understanding and gave informed consent for the injection. The skin was prepped with betadine in a sterile fashion. Under these sterile conditions, a [employer] ultrasound unit with a variable frequency linear transducer was used for precise placement of a 22-gauge needle into the subacromial bursa. A clean technique was utilized using sterile gloves and after prepping the patient under the stated sterile conditions, the patient was placed in the Seated position on the exam table. The posterior soft spot approximately 2 cm distal and 2 cm medial to the posterior acromial edge was identified and marked and a 3 cc solution containing 2 cc of 1% Lidocainewith 1 cc containing 40 mg of Kenalog was injected into the subacromial space with the 22-gauge needle. The needled was withdrawn and the injection site was cleansed. A Band-Aid was placed over the injection site. There was no resistance to the injection and the patient tolerated the procedure well without difficulty. Adverse reactions of the injection was discussed with the patient including signs of infection, increasing pain, redness, swelling, warmth, fever, chills and the patient was instructed to call immediately with any of these symptoms.  Successful needle placement was achieved and final images were taken and saved in the patient's chart for the permanent record. The images are stored on SD card in the machine until downloaded to the patient's chart. Radiology:   X-ray was reviewed from the [facility] clinic . Showed some mild degenerative changes of the AC joint. MRI SHOULDER LEFT WO CONTRAST    Result Date: 2/9/2023  EXAMINATION: MRI OF THE LEFT SHOULDER WITHOUT CONTRAST   2/9/2023 11:00 am TECHNIQUE: Multiplanar multisequence MRI of the left shoulder was performed without the administration of intravenous contrast. COMPARISON: None HISTORY: ORDERING SYSTEM PROVIDED HISTORY: Acute pain of left shoulder TECHNOLOGIST PROVIDED HISTORY: Reason for Exam: PT C/O LEFT SHOULDER PAIN X18 MONTHS. WORSENING IN LAST 6 MONTHS, TROUBLE SLEEPING ON SIDE, LROM, CLICKING IN SOME MOVEMENTS. 75-year-old male with acute left shoulder pain for 18 months; worsening in last 16 months FINDINGS: ROTATOR CUFF: Trace fluid in the subacromial subdeltoid bursa. Shallow partial-thickness articular-surface and interstitial tearing of mid supraspinatus between critical zone and footplate with moderate underlying tendinosis. Shallow partial-thickness interstitial and articular-surface tearing of anterior superior infraspinatus between critical zone and footplate. Mild underlying infraspinatus tendinosis. Low-grade partial-thickness interstitial tearing of the insertional fibers of subscapularis. Mild-to-moderate subscapularis tendinosis. Mild atrophy and fatty degeneration of teres minor. Teres minor tendon appears intact. BICEPS TENDON: Mild tendinosis of the long head of biceps tendon. LABRUM: Tearing of the posterosuperior labrum. GLENOHUMERAL JOINT: Mild glenohumeral chondromalacia. Inferior glenohumeral ligament appears intact. No sizable glenohumeral joint effusion. AC JOINT AND ACROMIOCLAVICULAR ARCH: Moderate degenerative change of the left AC joint. Type 2 acromion.  BONE MARROW: Marrow edema and subcortical cystic changes about the [facility] joint. No acute fracture or dislocation involving the osseous components of the shoulder. OUTLET SPACES: Suprascapular notch and quadrilateral space grossly unremarkable in appearance. No left axillary lymphadenopathy. 1. Shallow partial-thickness articular-surface and interstitial tearing of mid supraspinatus between critical zone and footplate with moderate underlying tendinosis. 2. Shallow partial-thickness interstitial and articular-surface tearing of anterior superior infraspinatus between critical zone and footplate. Mild underlying infraspinatus tendinosis. 3. Low-grade partial-thickness interstitial tearing of the insertional fibers of subscapularis. Mild-to-moderate subscapularis tendinosis. 4. Mild atrophy and fatty degeneration of teres minor. This can be seen with quadrilateral space syndrome. No obvious lesion identified in the quadrilateral space, however. 5. Mild tendinosis of the long head of biceps tendon. 6. Tearing at the posterosuperior labrum. 7. Moderate degenerative change of the left AC joint. 8. Mild glenohumeral chondromalacia. Plan:   Treatment : I reviewed the x-ray and MRI with the patient and I informed them that the MRI shows some diffuse degenerative changes throughout her shoulder including partial rotator cuff tears, chondromalacia and a labral tear. We discussed the etiologies and natural histories of rotator cuff of bilateral shoulders. We discussed the various treatment alternatives including anti-inflammatory medications, physical therapy, injections, further imaging studies and as a last result surgery. I did explain that rotator cuffs wear more than they tear. He does have wiring of his rotator cuff but he also has some chondromalacia and labral degeneration. He also has some biceps tendinitis of that left shoulder. I think he has the same thing going on on the right.   During today's visit, I explained that he has diffuse pain throughout his entire shoulder. I do think physical therapy and cortisone injections would be helpful. We may also need to do bicep tendon sheath injections but I do think we start with subacromial injections and get him to physical therapy see what settles down and what does not. At this time, the patient has opted for a cortisone injection into the bilateral subacromial injections to help reduce inflammation and pain. The injection site should never get red, hot, or swollen and if it does the patient will contact our office right away. The patient may experience a increase in soreness the first 24-48 hours due to a cortisone flair and can take anti-inflammatories for a short period of time to reduce that soreness. The patient should not submerge the injection site in water for a minimum of 24 hours to avoid infection. This means no lakes, pools, ponds, or hot tubs for 24 hours. If the patient is diabetic the injection may increase their blood sugar for up to one week. The patient can do this cortisone injection once every 3 months as needed. If the injections stop working and do not give the patient relief the patient should consider surgical interventions to produce long term relief. A physical therapy prescription was given. He states that he does have some weakness on the left side due to his stroke a few years ago. He would like to avoid surgery if possible. Patient should return to the clinic in 6 weeks to follow up with Angela Moreland PA-C. The patient will call the office immediately with any problems.       Orders Placed This Encounter   Medications    triamcinolone acetonide (KENALOG-40) injection 40 mg    lidocaine 1 % injection 4 mL    triamcinolone acetonide (KENALOG-40) injection 40 mg         Orders Placed This Encounter   Procedures    [facility] Physical Therapy [facility]     Referral Priority:   Routine     Referral Type:   Eval and Treat     Referral Reason:   Specialty Services Required Requested Specialty:   Physical Therapist     Number of Visits Requested:   1       This note is created with the assistance of a speech recognition program.  While intending to generate a document that actually reflects the content of the visit, the document can still have some errors including those of syntax and sound a like substitutions which may escape proof reading.   In such instances, actual meaning can be extrapolated by contextual diversion     Electronically signed by Mechelle Gomez PA-C, on 2/13/2023 at 2:38 PM

## 2023-02-16 DIAGNOSIS — M25.512 ACUTE PAIN OF LEFT SHOULDER: Primary | ICD-10-CM

## 2023-02-22 ENCOUNTER — HOSPITAL ENCOUNTER (OUTPATIENT)
Dept: PHYSICAL THERAPY | Facility: CLINIC | Age: 63
Setting detail: THERAPIES SERIES
Discharge: HOME | End: 2023-02-22
Payer: COMMERCIAL

## 2023-02-22 PROCEDURE — 97161 PT EVAL LOW COMPLEX 20 MIN: CPT

## 2023-02-22 PROCEDURE — 97110 THERAPEUTIC EXERCISES: CPT

## 2023-02-22 NOTE — CONSULTS
[x] [facility]  Outpatient Rehabilitation &  Therapy  [address] [place]   [address]   P: [phone]  F: [phone]      Physical Therapy Upper Extremity Evaluation    Date:  2023  Patient: Rico Smith   : 1960  MRN: [id number]  Physician: ROSIE Pham      Insurance: [employer] (Eff. 23, $0 Copay, $1,000DED/MET, $900OOP/MET, Pays 100% DED/OOP have been met,  no pre-cert required. PT is based on medical necessity per vincenzo yr, CPT code 63225 is not a billable/covered code. No units billed per visit limit)  Medical Diagnosis: Rotator cuff syndrome, right (M75.101 [ICD-10-CM]); Rotator cuff syndrome of left shoulder (M75.102 [ICD-10-CM])    Rehab Codes: M62.81, M25.511, M25.611, M25.512, M25.612, M79.622, R29.3   Onset Date: referral date 23   Next 's appt: 3/27/23    Subjective:   CC/HPI: Patient is a 63 y/o male presents to PT clinic with left hand dominant male that has had pain in the left shoulder for 18  months. As far as any trauma to the shoulder, the patient indicates no trauma except maybe catching a box that was falling. He works on his farm fixing buildings. The pain is worse at night and when doing overhead activities. Weakness of the shoulder has been noted     Had injections completed on 23. Found some exercises off the Internet, felt that this did made his pain worse. He has been applying ice.   He works out at [employer] 2-3x/week. Has only been doing cardio at this point.       PMHx: [] Unremarkable [] Diabetes [] HTN  [] Pacemaker   [] MI/Heart Problems [] Cancer [] Arthritis [] Other:              [x] Refer to full medical chart  In EPIC     Past Medical History          Diagnosis Date Comments     Fatty liver [K76.0]       Seasonal allergies [J30.2]       BP (high blood pressure) [I10] 2017      Stroke (HCC) [I63.9]  4 years ago- R sided stroke             Comorbidities:   [] Obesity [] Dialysis  [] N/A   [] Asthma/COPD [] Dementia []  Other:   [x] Stroke [] Sleep apnea [] Other:   [] Vascular disease [] Rheumatic disease [] Other:     Tests:   [] X-Ray:   [x] MRI: 2/3/23  Impression   1. Shallow partial-thickness articular-surface and interstitial tearing of   mid supraspinatus between critical zone and footplate with moderate   underlying tendinosis. 2. Shallow partial-thickness interstitial and articular-surface tearing of   anterior superior infraspinatus between critical zone and footplate. Mild   underlying infraspinatus tendinosis. 3. Low-grade partial-thickness interstitial tearing of the insertional fibers   of subscapularis. Mild-to-moderate subscapularis tendinosis. 4. Mild atrophy and fatty degeneration of teres minor. This can be seen with   quadrilateral space syndrome. No obvious lesion identified in the   quadrilateral space, however. 5. Mild tendinosis of the long head of biceps tendon. 6. Tearing at the posterosuperior labrum. 7. Moderate degenerative change of the left AC joint. 8. Mild glenohumeral chondromalacia.                  [] Other:    Medications: [x] Refer to full medical record [] None [] Other:  Allergies:      [x] Refer to full medical record  [] None [] Other:    Function:  Hand Dominance  [] Right  [x] Left  Marital Status Lives at home    Employement Retired  Works at a [employer] in season    Job status Off currently    Work Activities/duties  Standing at [employer], lifting heavy   Recreational Activities Softball       ADL/IADL [x] Previously independent with all [x] Currently independent with all Who currently assists the patient with task    [] Previously independent with all except: [] Currently independent with all except:    Bathing  [] Assist [] Assist    Dress/grooming [] Assist [] Assist    Transfer/mobility [] Assist [] Assist    Feeding [] Assist [] Assist    Toileting [] Assist [] Assist    Driving [] Assist [] Assist    Housekeeping [] Assist [] Assist    Grocery shop/meal prep [] Assist [] Assist        Gait Prior level of function Current level of function    [x] Independent  [] Assist [x] Independent  [] Assist   Device: [x] Independent [x] Independent    [] Straight Cane [] Quad cane [] Straight Cane [] Quad cane    [] Standard walker [] Rolling walker   [] 4 wheeled walker [] Standard walker [] Rolling walker   [] 4 wheeled walker    [] Wheelchair [] Wheelchair       Pain present?  Yes    Location Bilateral shoulders    Pain Rating currently 1/10    Pain at worse 7/10 - prior to the injection   Pain at best 1/10   Description of pain Constant achy pain  Intermittent shooting and sharp pain with use of his arm   Altered Sensation Intact   What makes it worse Positions, pressure, movement, sleeping positions    What makes it better Resting the arm, sitting, hot shower, pain creme    Symptom progression Gradually worsening, improved since the injection   Sleep Difficulty sleeping, has been able to sleep on his sides since the injections           Objective:      STRENGTH    Left Right   C5 Shld Abd 4* 4*   Shld Flexion 5 5   Shld IR 5 4*   Shld ER 4 4*   Shld HAB     Shld Ext 4 4   C6 Elb Flex 5 5   C7 Elb Ext 5 5   C8 EPL     T1 Fing Abd               Prone:     Retraction 3 3   Depression     IR     Abd 4 4-   Scaption NT NT   Flexion             AROM PROM    Left Right Left Right   Shld Abd WFL* WFL* 135 140   Shld Flex WFL* WFL* 160 WFL   Shld IR  To T12 WFL WFL   Shld ER  To T3 WFL WFL   Shld HAB           TESTS (+/-) LEFT RIGHT Not Tested   Vertebral A - - []   CRLF  - -    Painful Arc  + + []   Speeds   []   Neers + + []   Rose + + []   Empty Can + + []   Drop Arm   []   Post Apprehension   [x]   Ant Apprehension    [x]   Clunk   [x]   Sulcus   [x]   Elbow varus/valgus   [x]   Tinel   [x]      [x]      [x]       OBSERVATION No Deficit Deficit Not Tested Comments   Forward Head [] [x] []    Rounded Shoulders [] [x] []    Kyphosis [] [x] [] Mild thoracic kyphosis    Scap Height/Position [] [x] [] Protracted shoulders bilaterally    Winging [] [x] [] Slight R scapular winging at 90 degrees abduction   SH Rhythm [] [x] [] Poor    INSPECTION/PALPATION       SC/AC Joint [] [x] [] Tender on the R shoulder   Supraspinatus [] [x] [] Bilateral tenderness   Biceps tendon/groove [x] [] []    Posterior shld [] [x] [] Tender on the R shoulder   Subscapularis [] [x] [] Tender on the L    NEUROLOGICAL       Cervical ROM/Quadrant [] [x] [] Cervical motion  flex/ext: WFL  Rotation: dec to the R by 25%  Sidebend: dec to the R by 25%      Reflexes [x] [] []    Compression/Distraction [x] [] []    Sensation [x] [] []        Flexibility Normal LUE Deficit RUE Deficit Comments    UTrap [] [x] []    L. Scap [] [x] []    Pec Major [] [x] [x] L>R   Pec Minor [] [x] [x] L>R   Lats [] [x] []        Functional Test: QuickDASH Score: 41% functionally impaired     Comments:    Assessment: 59 y/o male presents to PT clinic with bilateral shoulder pain that has been getting gradually worse for the last 18 months. Unable to play softball this summer as he had difficulty with this last year. Dx with rotator cuff syndrome. Injection improved his pain. On exam, patient has decreased scapular strength. Symptoms and clinic testing indicate some rotator cuff involvement. This has been confirmed by MRI on the L shoulder, no testing done on the R. Patient would benefit from skilled physical therapy services in order to: improve posture, improve UE strength, improve shoulder stiffness and mobility, and decrease pain so he can return to his normal ADL's as desires     Problems:    [x] ? Pain:  [x] ? ROM:  [x] ? Strength:  [x] ?  Function:  [] Other:        Goals  MET NOT MET ON-  GOING  Details   Date Addressed:        STG: To be met in 6 treatments           1. ? Pain: Decrease pain levels to 3/10 with ADLs []  []  []      2. ? ROM: Increase bilateral shoulder AROM limitations throughout to equal bilat without pain to reduce difficulty with ADLs []  []  []      3. Patient to demonstrate improvements in his dynamic posture requiring minimal cueing during session to reduce compensatory upper trapezius activation []  []  []     4. ? Strength: Increase UE MMT to 5/5 throughout to ease functional limitations and mobility  []  []  []     5. Independent with Home Exercise Programs []  []  []       []  []  []      []  []  []     Date Addressed:        LTG: To be met in 12 treatments       1. Improve score on assessment tool QuickDASH from 41% impairment to less than 20% impairment  []  []  []     2. Reduce pain levels to 0/10 or less with ADLs []  []  []      []  []  []                               Patient goals: decrease shoulder pain       Rehab Potential:  [x] Good  [] Fair  [] Poor   Suggested Professional Referral:  [x] No  [] Yes:  Barriers to Goal Achievement:  [x] No  [] Yes:  Domestic Concerns:  [x] No  [] Yes:    Pt. Education:  [x] Plans/Goals, Risks/Benefits discussed  [x] Home exercise program  Method of Education: [x] Verbal  [x] Demo  [x] Written    Access Code: [email or web address]  URL: [email or web address]  Date: 02/22/2023  Prepared by: Guillermo Smith    Exercises  Doorway Pec Stretch at 90 Degrees Abduction - 2 x daily - 7 x weekly - 3 sets - 30 (sec) hold  Doorway Pec Stretch at 120 Degrees Abduction - 2 x daily - 7 x weekly - 2 sets - 30 (sec) hold  Standing Shoulder Row with Anchored Resistance - 1 x daily - 7 x weekly - 2 sets - 10 reps  Shoulder Extension with Resistance - 1 x daily - 7 x weekly - 2 sets - 10 reps  Shoulder External Rotation with Anchored Resistance - 1 x daily - 7 x weekly - 2 sets - 10 reps  Seated Scapular Retraction - 2 x daily - 7 x weekly - 3 sets - 10 reps - 5 (sec) hold  Correct Seated Posture - 1 x daily - 7 x weekly - 3 sets - 10 reps      Comprehension of Education:  [x] Verbalizes understanding. [x] Demonstrates understanding. [x] Needs Review.   [] Demonstrates/verbalizes understanding of HEP/Ed previously given. Treatment Plan:  [x] Therapeutic Exercise   08969  [] Iontophoresis: 4 mg/mL Dexamethasone Sodium Phosphate  mAmin  97596   [x] Therapeutic Activity  98319 [x] Vasopneumatic cold with compression  74386    [x] Gait Training   45141 [] Ultrasound   26538   [x] Neuromuscular Re-education  69895 [] Electrical Stimulation Unattended  56920   [x] Manual Therapy  22028 [] Electrical Stimulation Attended  80654   [x] Instruction in HEP  [] Lumbar/Cervical Traction  24399   [] Aquatic Therapy   10694 [] Cold/hotpack    [] Massage   59622      [] Dry Needling, 1 or 2 muscles  59807   [] Biofeedback, first 15 minutes   19768  [] Biofeedback, additional 15 minutes   30876 [] Dry Needling, 3 or more muscles  54964     []  Medication allergies reviewed for use of    Dexamethasone Sodium Phosphate 4mg/ml     with iontophoresis treatments. Pt is not allergic.        Frequency: 2 x/week for 12 visits    Todays Treatment:  Modalities:   Precautions: none  Exercise   Reps/ Time Weight/ Level Comments         UBE/Pulley's             Corner Pectoral Stretch  X30\"  At 90 and 120          Scapular Retractions  x10  HEP               Upper Trap Stretch       Levator Scap Stretch             Prone  next     Scap Retractions       Scap Depressions             Tband    HEP   Rows  x20 Blue     Extension  x20 Blue     Bilat ER  x20 Blue     Bilat horiz abd  x20 Blue               Specific Instructions for next treatment: scapular strengthening, postural education, end range stretching       Evaluation Complexity:  History (Personal factors, comorbidities) [] 0 [x] 1-2 [] 3+   Exam (limitations, restrictions) [] 1-2 [] 3 [x] 4+   Clinical presentation (progression) [x] Stable [] Evolving  [] Unstable   Decision Making [x] Low [] Moderate [] High    [x] Low Complexity [] Moderate Complexity [] High Complexity       Treatment Charges: Mins Units   [x] Evaluation       [x]  Low       [] Moderate       []  High 30 1   []  Modalities     [x]  Ther Exercise 20 1   []  Manual Therapy     []  Ther Activities     []  Aquatics     []  Vasocompression     []  Other       TOTAL TREATMENT TIME: 50 min     Time in: 2:10p    Time Out: 3:00p    Electronically signed by: Mat Souza PT        Physician Signature:________________________________Date:__________________  By signing above or cosigning this note, I have reviewed this plan of care and certify a need for medically necessary rehabilitation services.      *PLEASE SIGN ABOVE AND FAX BACK ALL PAGES*

## 2023-02-28 ENCOUNTER — APPOINTMENT (OUTPATIENT)
Dept: PHYSICAL THERAPY | Facility: CLINIC | Age: 63
End: 2023-02-28
Payer: COMMERCIAL

## 2023-03-02 ENCOUNTER — HOSPITAL ENCOUNTER (OUTPATIENT)
Dept: PHYSICAL THERAPY | Facility: CLINIC | Age: 63
Setting detail: THERAPIES SERIES
Discharge: HOME | End: 2023-03-02
Payer: COMMERCIAL

## 2023-03-02 PROCEDURE — 97110 THERAPEUTIC EXERCISES: CPT

## 2023-03-02 NOTE — FLOWSHEET NOTE
[x] THE [facility] &  Therapy  [facility]   [place]: [phone]  F: [phone]      Physical Therapy Daily Treatment Note    Date:  3/2/2023  Patient Name:  Blake Ramos    :  1960  MRN: [id number]  Physician: ROSIE Cash                                                Insurance: [facility]. 23, $0 Copay, $1,000DED/MET, $900OOP/MET, Pays 100% DED/OOP have been met,  no pre-cert required. PT is based on medical necessity per vincenzo yr, CPT code 96671 is not a billable/covered code. No units billed per visit limit)  Medical Diagnosis: Rotator cuff syndrome, right (M75.101 [ICD-10-CM]); Rotator cuff syndrome of left shoulder (M75.102 [ICD-10-CM])                 Rehab Codes: M62.81, M25.511, M25.611, M25.512, M25.612, M79.622, R29.3   Onset Date: referral date 23                Next 's appt: 3/27/23  Visit# / total visits:     Cancels/No Shows: 0/0    Subjective:    Pain:  [] Yes  [x] No Location: bilateral shoulders  Pain Rating: (0-10 scale) 0/10  Pain altered Tx:  [x] No  [] Yes  Action:  Comments: Patient reports soreness, no pain. Compliant with HEP. Patient states he has a darts tournament this . Objective:   Todays Treatment:  Modalities:   Precautions: none  Exercise    Reps/ Time Weight/ Level Comments             Pulley's   3'/3'  Flex/Abd               Corner Pectoral Stretch  3x30\" T/Y HEP             Scapular Retractions  HEP      Upper Trap Stretch  3x30\"       Levator Scap Stretch  3x30\"                 Prone         Scap Retractions  10x5\"       Scap Depressions  10x5\"                TBand Rows  x20 Blue  HEP   TBand Extension  x20 Blue  HEP   TBand Bilat ER  x20 Blue  HEP   TBand Bilat horiz abd  Held pain Blue  HEP   TBand IR next       TBand ER next                       Specific Instructions for next treatment: Assess pec minor tightness       Treatment Charges: Mins Units   []  Modalities     [x]  Ther Exercise 29 2   []  Manual Therapy     []  Ther Activities     []  Aquatics     []  Vasocompression     []  Other     Total Treatment time 29 2       Assessment: [x] Progressing toward goals. Patient notes increased pain with pec minor corner stretching due to increased muscle tension, advised patient to stretch for only 15-20 seconds and decrease intensity of stretch for when he stretches at home. Reviewed all HEP given previously with good recall requiring some cues to correct form and avoid upper trap compensation. Held resisted HAB due to [facility] joint pain in bilateral shoulders. Will assess the need for manual to bilateral pec minor muscles. [] No change. [] Other:  [x] Patient would continue to benefit from skilled physical therapy services in order to:  improve posture, improve UE strength, improve shoulder stiffness and mobility, and decrease pain so he can return to his normal ADL's as desires     STG/LTG  Goals  MET NOT MET ON-  GOING  Details   Date Addressed:            STG: To be met in 6 treatments            1. ? Pain: Decrease pain levels to 3/10 with ADLs []  []  []      2. ? ROM: Increase bilateral shoulder AROM limitations throughout to equal bilat without pain to reduce difficulty with ADLs []  []  []      3. Patient to demonstrate improvements in his dynamic posture requiring minimal cueing during session to reduce compensatory upper trapezius activation []  []  []      4. ? Strength: Increase UE MMT to 5/5 throughout to ease functional limitations and mobility  []  []  []      5. Independent with Home Exercise Programs []  []  []        []  []  []        []  []  []      Date Addressed:            LTG: To be met in 12 treatments           1. Improve score on assessment tool QuickDASH from 41% impairment to less than 20% impairment  []  []  []      2.  Reduce pain levels to 0/10 or less with ADLs []  []  []        []  []  []                                       Patient goals: decrease shoulder pain         Rehab Potential:  [x] Good  [] Fair  [] Poor   Suggested Professional Referral:  [x] No  [] Yes:  Barriers to Goal Achievement:  [x] No  [] Yes:  Domestic Concerns:  [x] No  [] Yes:    Pt. Education:  [x] Yes  [] No  [x] Reviewed Prior HEP/Ed  Method of Education: [x] Verbal  [x] Demo  [x] Written    Access Code: [email or web address]  URL: [email or web address]  Date: 02/22/2023  Prepared by: Supriya Anis     Exercises  Doorway Pec Stretch at 90 Degrees Abduction - 2 x daily - 7 x weekly - 3 sets - 30 (sec) hold  Doorway Pec Stretch at 120 Degrees Abduction - 2 x daily - 7 x weekly - 2 sets - 30 (sec) hold  Standing Shoulder Row with Anchored Resistance - 1 x daily - 7 x weekly - 2 sets - 10 reps  Shoulder Extension with Resistance - 1 x daily - 7 x weekly - 2 sets - 10 reps  Shoulder External Rotation with Anchored Resistance - 1 x daily - 7 x weekly - 2 sets - 10 reps  Seated Scapular Retraction - 2 x daily - 7 x weekly - 3 sets - 10 reps - 5 (sec) hold  Correct Seated Posture - 1 x daily - 7 x weekly - 3 sets - 10 reps    Comprehension of Education:  [x] Verbalizes understanding. [] Demonstrates understanding. [x] Needs review. [] Demonstrates/verbalizes HEP/Ed previously given. Plan: [x] Continue current frequency toward long and short term goals.           Time In: 2:30pm            Time Out: 3:05pm    Electronically signed by:  Heladio Wild PTA

## 2023-03-07 ENCOUNTER — HOSPITAL ENCOUNTER (OUTPATIENT)
Dept: PHYSICAL THERAPY | Facility: CLINIC | Age: 63
Setting detail: THERAPIES SERIES
Discharge: HOME | End: 2023-03-07
Payer: COMMERCIAL

## 2023-03-07 PROCEDURE — 97140 MANUAL THERAPY 1/> REGIONS: CPT

## 2023-03-07 PROCEDURE — 97110 THERAPEUTIC EXERCISES: CPT

## 2023-03-07 NOTE — FLOWSHEET NOTE
[x] THE [facility] &  Therapy  [facility]   [place]: [phone]  F: [phone]      Physical Therapy Daily Treatment Note    Date:  3/7/2023  Patient Name:  Sadiq Horowitz    :  1960  MRN: [id number]  Physician: ROSIE Long                                                Insurance: [facility]. 23, $0 Copay, $1,000DED/MET, $900OOP/MET, Pays 100% DED/OOP have been met,  no pre-cert required. PT is based on medical necessity per vincenzo yr, CPT code 50656 is not a billable/covered code. No units billed per visit limit)  Medical Diagnosis: Rotator cuff syndrome, right (M75.101 [ICD-10-CM]); Rotator cuff syndrome of left shoulder (M75.102 [ICD-10-CM])                 Rehab Codes: M62.81, M25.511, M25.611, M25.512, M25.612, M79.622, R29.3   Onset Date: referral date 23                Next 's appt: 3/27/23  Visit# / total visits: 3/20    Cancels/No Shows: 0/0    Subjective:    Pain:  [] Yes  [x] No Location: bilateral shoulders  Pain Rating: (0-10 scale) 0/10  Pain altered Tx:  [x] No  [] Yes  Action:  Comments: Patient reports some soreness in his left shoulder after last PT appointment. Had no pain throwing the darts at his tournament this weekend. Objective:   Todays Treatment:  Modalities:   Precautions: none  Exercise    Reps/ Time Weight/ Level Comments             Pulley's   2'/2'  Flex/Abd               Corner Pectoral Stretch  3x30\" T/Y HEP             Scapular Retractions  HEP      Upper Trap Stretch  3x30\"       Levator Scap Stretch  3x30\"                 Prone         Rows  2x10 3#     Extension  2x10 2#     Horiz abd  2x10 0#     Scaption  next           TBand Rows  x20 Blue  HEP   TBand Extension  x20 Blue  HEP   TBand Bilat ER  Held [email or web address]  HEP   TBand Bilat horiz abd  Held Blue  HEP   TBand IR x20 Blue      TBand ER x20 Blue           Lower trapezius raises at wall  Next       Other: Manual MFR to the bilateral pectoralis muscles      Specific Instructions for next treatment: Assess pec minor tightness       Treatment Charges: Mins Units   []  Modalities     [x]  Ther Exercise 34 2   [x]  Manual Therapy 10 1   []  Ther Activities     []  Aquatics     []  Vasocompression     []  Other     Total Treatment time 44 3       Assessment: [x] Progressing toward goals. Patient continues to require cueing on band exercises. Added prone program with fatigue, but no complaints of pain. Good scapular retraction noted. Ended session with pectoralis muscle release to decrease anterior chest tightness. Discussed delayed onset muscle soreness as well as chest soreness from manual. Will continue to progress strength program next session as able. [] No change. [] Other:  [x] Patient would continue to benefit from skilled physical therapy services in order to:  improve posture, improve UE strength, improve shoulder stiffness and mobility, and decrease pain so he can return to his normal ADL's as desires     STG/LTG  Goals  MET NOT MET ON-  GOING  Details   Date Addressed:            STG: To be met in 6 treatments            1. ? Pain: Decrease pain levels to 3/10 with ADLs []  []  []      2. ? ROM: Increase bilateral shoulder AROM limitations throughout to equal bilat without pain to reduce difficulty with ADLs []  []  []      3. Patient to demonstrate improvements in his dynamic posture requiring minimal cueing during session to reduce compensatory upper trapezius activation []  []  []      4. ? Strength: Increase UE MMT to 5/5 throughout to ease functional limitations and mobility  []  []  []      5. Independent with Home Exercise Programs []  []  []        []  []  []        []  []  []      Date Addressed:            LTG: To be met in 12 treatments           1. Improve score on assessment tool QuickDASH from 41% impairment to less than 20% impairment  []  []  []      2.  Reduce pain levels to 0/10 or less with ADLs []  []  []        []  []  [] Patient goals: decrease shoulder pain         Rehab Potential:  [x] Good  [] Fair  [] Poor   Suggested Professional Referral:  [x] No  [] Yes:  Barriers to Goal Achievement:  [x] No  [] Yes:  Domestic Concerns:  [x] No  [] Yes:    Pt. Education:  [x] Yes  [] No  [x] Reviewed Prior HEP/Ed  Method of Education: [x] Verbal  [x] Demo  [x] Written    Access Code: [email or web address]  URL: [email or web address]  Date: 02/22/2023  Prepared by: Nancy Liu     Exercises  Doorway Pec Stretch at 90 Degrees Abduction - 2 x daily - 7 x weekly - 3 sets - 30 (sec) hold  Doorway Pec Stretch at 120 Degrees Abduction - 2 x daily - 7 x weekly - 2 sets - 30 (sec) hold  Standing Shoulder Row with Anchored Resistance - 1 x daily - 7 x weekly - 2 sets - 10 reps  Shoulder Extension with Resistance - 1 x daily - 7 x weekly - 2 sets - 10 reps  Shoulder External Rotation with Anchored Resistance - 1 x daily - 7 x weekly - 2 sets - 10 reps  Seated Scapular Retraction - 2 x daily - 7 x weekly - 3 sets - 10 reps - 5 (sec) hold  Correct Seated Posture - 1 x daily - 7 x weekly - 3 sets - 10 reps    Comprehension of Education:  [x] Verbalizes understanding. [] Demonstrates understanding. [x] Needs review. [] Demonstrates/verbalizes HEP/Ed previously given. Plan: [x] Continue current frequency toward long and short term goals.           Time In: 1:50 pm            Time Out: 2:40 pm    Electronically signed by:  Nancy Liu, PT

## 2023-03-09 ENCOUNTER — HOSPITAL ENCOUNTER (OUTPATIENT)
Dept: PHYSICAL THERAPY | Facility: CLINIC | Age: 63
Setting detail: THERAPIES SERIES
Discharge: HOME | End: 2023-03-09
Payer: COMMERCIAL

## 2023-03-09 PROCEDURE — 97110 THERAPEUTIC EXERCISES: CPT

## 2023-03-09 NOTE — FLOWSHEET NOTE
[x] THE [facility] &  Therapy  [facility]   [place]: [phone]  F: [phone]      Physical Therapy Daily Treatment Note    Date:  3/9/2023  Patient Name:  Yoly Qureshi    :  1960  MRN: [id number]  Physician: ROSIE Sheets                                                Insurance: [facility]. 23, $0 Copay, $1,000DED/MET, $900OOP/MET, Pays 100% DED/OOP have been met,  no pre-cert required. PT is based on medical necessity per vincenzo yr, CPT code 79819 is not a billable/covered code. No units billed per visit limit)  Medical Diagnosis: Rotator cuff syndrome, right (M75.101 [ICD-10-CM]); Rotator cuff syndrome of left shoulder (M75.102 [ICD-10-CM])                 Rehab Codes: M62.81, M25.511, M25.611, M25.512, M25.612, M79.622, R29.3   Onset Date: referral date 23                Next 's appt: 3/27/23    Visit# / total visits:     Cancels/No Shows: 0/0    Subjective:    Pain:  [] Yes  [x] No Location: bilateral shoulders  Pain Rating: (0-10 scale) 0/10  Pain altered Tx:  [x] No  [] Yes  Action:  Comments: Patient arrived noting no complaint of pain, notes soreness from previous visits manual therapy. Objective:   Todays Treatment:  Modalities:   Precautions: none  Exercise    Reps/ Time Weight/ Level Comments             Pulley's   2'/2'  Flex/Abd               Corner Pectoral Stretch  3x30\" T/Y HEP             Scapular Retractions  HEP      Upper Trap Stretch  3x30\"       Levator Scap Stretch  3x30\"                 Prone         Rows  2x10 3#     Extension  2x10 3#     Horiz abd  2x10 0#     Scaption  2x10 0#          TBand Rows  x20 Blue  HEP   TBand Extension  x20 Blue  HEP   TBand Bilat ER  Held Brandlive  HEP   TBand Bilat horiz abd  Held Blue  HEP   TBand IR x20 Blue      TBand ER x20 Blue           Standing      Flexion 2x10 1#    Scaption 2x10 1#    Abduction 2x10 1#          Lower trapezius raises at wall  Next       Other: Manual MFR to the bilateral pectoralis muscles - Held 3/0/23      Specific Instructions for next treatment: Assess pec minor tightness       Treatment Charges: Mins Units   []  Modalities     [x]  Ther Exercise 35 2   []  Manual Therapy     []  Ther Activities     []  Aquatics     []  Vasocompression     []  Other     Total Treatment time 35 2       Assessment: [x] Progressing toward goals. Progressed strength program this date. Minor cues needed throughout treatment for form and technique. Patient notes \"clicking and popping\" in sanket shoulders with no associated pain. Fatigue noted in shoulders post treatment. Held manual this date as patient complained of continued soreness. [] No change. [] Other:  [x] Patient would continue to benefit from skilled physical therapy services in order to:  improve posture, improve UE strength, improve shoulder stiffness and mobility, and decrease pain so he can return to his normal ADL's as desires       Goals  MET NOT MET ON-  GOING  Details   Date Addressed:            STG: To be met in 6 treatments            1. ? Pain: Decrease pain levels to 3/10 with ADLs []  []  []      2. ? ROM: Increase bilateral shoulder AROM limitations throughout to equal bilat without pain to reduce difficulty with ADLs []  []  []      3. Patient to demonstrate improvements in his dynamic posture requiring minimal cueing during session to reduce compensatory upper trapezius activation []  []  []      4. ? Strength: Increase UE MMT to 5/5 throughout to ease functional limitations and mobility  []  []  []      5. Independent with Home Exercise Programs []  []  []        []  []  []        []  []  []      Date Addressed:            LTG: To be met in 12 treatments           1. Improve score on assessment tool QuickDASH from 41% impairment to less than 20% impairment  []  []  []      2.  Reduce pain levels to 0/10 or less with ADLs []  []  []        []  []  []                                  Patient goals: decrease shoulder pain       Pt. Education:  [x] Yes  [] No  [x] Reviewed Prior HEP/Ed  Method of Education: [x] Verbal  [x] Demo  [x] Written    Access Code: [email or web address]  URL: [email or web address]  Date: 02/22/2023  Prepared by: Julia Christiansen     Exercises  Doorway Pec Stretch at 90 Degrees Abduction - 2 x daily - 7 x weekly - 3 sets - 30 (sec) hold  Doorway Pec Stretch at 120 Degrees Abduction - 2 x daily - 7 x weekly - 2 sets - 30 (sec) hold  Standing Shoulder Row with Anchored Resistance - 1 x daily - 7 x weekly - 2 sets - 10 reps  Shoulder Extension with Resistance - 1 x daily - 7 x weekly - 2 sets - 10 reps  Shoulder External Rotation with Anchored Resistance - 1 x daily - 7 x weekly - 2 sets - 10 reps  Seated Scapular Retraction - 2 x daily - 7 x weekly - 3 sets - 10 reps - 5 (sec) hold  Correct Seated Posture - 1 x daily - 7 x weekly - 3 sets - 10 reps    Comprehension of Education:  [x] Verbalizes understanding. [] Demonstrates understanding. [x] Needs review. [] Demonstrates/verbalizes HEP/Ed previously given. Plan: [x] Continue current frequency toward long and short term goals.           Time In: 1400              Time Out: 1440    Electronically signed by:  Harrington Gottron, PTA

## 2023-03-14 ENCOUNTER — HOSPITAL ENCOUNTER (OUTPATIENT)
Dept: PHYSICAL THERAPY | Facility: CLINIC | Age: 63
Setting detail: THERAPIES SERIES
Discharge: HOME | End: 2023-03-14
Payer: COMMERCIAL

## 2023-03-14 PROCEDURE — 97110 THERAPEUTIC EXERCISES: CPT

## 2023-03-14 NOTE — FLOWSHEET NOTE
[x] THE [facility] &  Therapy  [facility]   [place]: [phone]  F: [phone]      Physical Therapy Daily Treatment Note    Date:  3/14/2023  Patient Name:  Chica Cook    :  1960  MRN: [id number]  Physician: ROSIE Major                                                Insurance: [facility]. 23, $0 Copay, $1,000DED/MET, $900OOP/MET, Pays 100% DED/OOP have been met,  no pre-cert required. PT is based on medical necessity per vincenzo yr, CPT code 30062 is not a billable/covered code. No units billed per visit limit)  Medical Diagnosis: Rotator cuff syndrome, right (M75.101 [ICD-10-CM]); Rotator cuff syndrome of left shoulder (M75.102 [ICD-10-CM])                 Rehab Codes: M62.81, M25.511, M25.611, M25.512, M25.612, M79.622, R29.3   Onset Date: referral date 23                Next 's appt: 3/27/23  Visit# / total visits:     Cancels/No Shows: 0/0    Subjective:    Pain:  [] Yes  [x] No Location: bilateral shoulders  Pain Rating: (0-10 scale) 0/10  Pain altered Tx:  [x] No  [] Yes  Action:  Comments: Patient arrived without complaints. Denies soreness following previous session.      Objective:  Modalities:   Precautions: none  Exercise    Reps/ Time Weight/ Level Comments             Pulley's     Flex/Abd     Bike - UE  10'       Corner Pectoral Stretch  3x30\" T/Y HEP             Scapular Retractions  HEP      Upper Trap Stretch  HEP       Levator Scap Stretch  HEP                 Prone         Rows  2x10 3#     Extension  2x10 3#     Horiz abd  2x10 1#     Scaption  2x10 1#          TBand Rows  2x20 Blue  HEP   TBand Extension  x20 Blue  HEP   TBand Bilat ER   Blue  HEP   TBand Bilat horiz abd   Blue  HEP   TBand IR x20 Blue      TBand ER x20 Blue           Standing      Flexion 2x10 2#    Scaption 2x10 2#    Abduction 2x10 2#          Lower trapezius raises at wall  Next      Wall Push up plus  x11      Other: Manual MFR to the bilateral pectoralis muscles - Held 3/9/23      Specific Instructions for next treatment: Assess pec minor tightness, progress band exercises        Treatment Charges: Mins Units   []  Modalities     [x]  Ther Exercise 33 2   []  Manual Therapy     []  Ther Activities     []  Aquatics     []  Vasocompression     []  Other     Total Treatment time 33 2       Assessment: [x] Progressing toward goals. Intermittent complaints of anterior shoulder pain of the R shoulder. Voices the pain after the completion of the exercise. Able to complete all exercises this date with good form and only minimal cueing. Ended with wall push-ups, though patient was only able to complete 11 secondary to muscle fatigue. [] No change. [] Other:  [x] Patient would continue to benefit from skilled physical therapy services in order to:  improve posture, improve UE strength, improve shoulder stiffness and mobility, and decrease pain so he can return to his normal ADL's as desires       Goals  MET NOT MET ON-  GOING  Details   Date Addressed:            STG: To be met in 6 treatments            1. ? Pain: Decrease pain levels to 3/10 with ADLs []  []  []      2. ? ROM: Increase bilateral shoulder AROM limitations throughout to equal bilat without pain to reduce difficulty with ADLs []  []  []      3. Patient to demonstrate improvements in his dynamic posture requiring minimal cueing during session to reduce compensatory upper trapezius activation []  []  []      4. ? Strength: Increase UE MMT to 5/5 throughout to ease functional limitations and mobility  []  []  []      5. Independent with Home Exercise Programs []  []  []        []  []  []        []  []  []      Date Addressed:            LTG: To be met in 12 treatments           1. Improve score on assessment tool QuickDASH from 41% impairment to less than 20% impairment  []  []  []      2.  Reduce pain levels to 0/10 or less with ADLs []  []  []        []  []  [] Patient goals: decrease shoulder pain       Pt. Education:  [x] Yes  [] No  [x] Reviewed Prior HEP/Ed  Method of Education: [x] Verbal  [x] Demo  [x] Written    Access Code: [email or web address]  URL: [email or web address]  Date: 02/22/2023  Prepared by: Gayathri Machado     Exercises  Doorway Pec Stretch at 90 Degrees Abduction - 2 x daily - 7 x weekly - 3 sets - 30 (sec) hold  Doorway Pec Stretch at 120 Degrees Abduction - 2 x daily - 7 x weekly - 2 sets - 30 (sec) hold  Standing Shoulder Row with Anchored Resistance - 1 x daily - 7 x weekly - 2 sets - 10 reps  Shoulder Extension with Resistance - 1 x daily - 7 x weekly - 2 sets - 10 reps  Shoulder External Rotation with Anchored Resistance - 1 x daily - 7 x weekly - 2 sets - 10 reps  Seated Scapular Retraction - 2 x daily - 7 x weekly - 3 sets - 10 reps - 5 (sec) hold  Correct Seated Posture - 1 x daily - 7 x weekly - 3 sets - 10 reps    Comprehension of Education:  [x] Verbalizes understanding. [] Demonstrates understanding. [x] Needs review. [] Demonstrates/verbalizes HEP/Ed previously given. Plan: [x] Continue current frequency toward long and short term goals.           Time In: 1330             Time Out: 1413    Electronically signed by:  Gayathri Machado, PT

## 2023-03-16 ENCOUNTER — APPOINTMENT (OUTPATIENT)
Dept: PHYSICAL THERAPY | Facility: CLINIC | Age: 63
End: 2023-03-16
Payer: COMMERCIAL

## 2023-03-17 ENCOUNTER — HOSPITAL ENCOUNTER (OUTPATIENT)
Dept: PHYSICAL THERAPY | Facility: CLINIC | Age: 63
Setting detail: THERAPIES SERIES
Discharge: HOME | End: 2023-03-17
Payer: COMMERCIAL

## 2023-03-17 PROCEDURE — 97110 THERAPEUTIC EXERCISES: CPT

## 2023-03-17 NOTE — FLOWSHEET NOTE
[x] THE [facility] &  Therapy  [facility]   [place]: [phone]  F: [phone]      Physical Therapy Daily Treatment Note    Date:  3/17/2023  Patient Name:  Rey Kang    :  1960  MRN: [id number]  Physician: ROSIE Marshall                                                Insurance: [facility]. 23, $0 Copay, $1,000DED/MET, $900OOP/MET, Pays 100% DED/OOP have been met,  no pre-cert required. PT is based on medical necessity per rey yr, CPT code 20568 is not a billable/covered code. No units billed per visit limit)  Medical Diagnosis: Rotator cuff syndrome, right (M75.101 [ICD-10-CM]); Rotator cuff syndrome of left shoulder (M75.102 [ICD-10-CM])                 Rehab Codes: M62.81, M25.511, M25.611, M25.512, M25.612, M79.622, R29.3   Onset Date: referral date 23                Next 's appt: 3/27/23  Visit# / total visits:     Cancels/No Shows: 0/0    Subjective:    Pain:  [] Yes  [x] No Location: bilateral shoulders  Pain Rating: (0-10 scale) 0/10  Pain altered Tx:  [x] No  [] Yes  Action:  Comments: Patient arrived without complaints. Reports that he was asked to play softball this summer, but he refused and is motivated to heal his shoulder.      Objective:  Modalities:   Precautions: none  Exercise    Reps/ Time Weight/ Level Comments             Pulley's     Flex/Abd     Bike - UE  10'       Corner Pectoral Stretch  3x30\" T/Y HEP             Scapular Retractions  HEP      Upper Trap Stretch  HEP       Levator Scap Stretch  HEP                 Prone         Rows  2x10 3#     Extension  2x10 3#     Horiz abd  2x10 1#     Scaption   1#          TBand Rows  2x20 Blue  HEP   TBand Extension  x20 Blue  HEP   TBand Bilat ER   Blue  HEP   TBand Bilat horiz abd   Blue  HEP   TBand IR x20 Blue      TBand ER x20 Blue           Standing      Flexion 2x10 2#    Scaption  2#    Abduction 2x10 2#    Standing snow allie  x10  Maintain scapular retraction/depression         Lower trapezius raises at wall  x15     Wall Push up plus  x15      Other: Manual MFR to the bilateral pectoralis muscles - Held 3/9/23      Specific Instructions for next treatment: Assess pec minor tightness, progress band exercises        Treatment Charges: Mins Units   []  Modalities     [x]  Ther Exercise 34 2   []  Manual Therapy     []  Ther Activities     []  Aquatics     []  Vasocompression     []  Other     Total Treatment time 34 2       Assessment: [x] Progressing toward goals. Continued program with continued fatigue as program was progressed. Minimal progression, focus on technique and postural adjustments with good carryover. Reports muscle soreness at end of session, denies pain. [] No change. [] Other:  [x] Patient would continue to benefit from skilled physical therapy services in order to:  improve posture, improve UE strength, improve shoulder stiffness and mobility, and decrease pain so he can return to his normal ADL's as desires       Goals  MET NOT MET ON-  GOING  Details   Date Addressed: 3/17/23           STG: To be met in 6 treatments            1. ? Pain: Decrease pain levels to 3/10 with ADLs [x]  []  []      2. ? ROM: Increase bilateral shoulder AROM limitations throughout to equal bilat without pain to reduce difficulty with ADLs [x]  []  []      3. Patient to demonstrate improvements in his dynamic posture requiring minimal cueing during session to reduce compensatory upper trapezius activation []  [x]  []      4. ? Strength: Increase UE MMT to 5/5 throughout to ease functional limitations and mobility  []  [x]  []      5. Independent with Home Exercise Programs []  [x]  []        []  []  []        []  []  []      Date Addressed:            LTG: To be met in 12 treatments           1. Improve score on assessment tool QuickDASH from 41% impairment to less than 20% impairment  []  []  []      2.  Reduce pain levels to 0/10 or less with ADLs []  [] []        []  []  []                                  Patient goals: decrease shoulder pain       Pt. Education:  [x] Yes  [] No  [x] Reviewed Prior HEP/Ed  Method of Education: [x] Verbal  [x] Demo  [x] Written    Access Code: [email or web address]  URL: [email or web address]  Date: 02/22/2023  Prepared by: Juan F Rodriguez     Exercises  Doorway Pec Stretch at 90 Degrees Abduction - 2 x daily - 7 x weekly - 3 sets - 30 (sec) hold  Doorway Pec Stretch at 120 Degrees Abduction - 2 x daily - 7 x weekly - 2 sets - 30 (sec) hold  Standing Shoulder Row with Anchored Resistance - 1 x daily - 7 x weekly - 2 sets - 10 reps  Shoulder Extension with Resistance - 1 x daily - 7 x weekly - 2 sets - 10 reps  Shoulder External Rotation with Anchored Resistance - 1 x daily - 7 x weekly - 2 sets - 10 reps  Seated Scapular Retraction - 2 x daily - 7 x weekly - 3 sets - 10 reps - 5 (sec) hold  Correct Seated Posture - 1 x daily - 7 x weekly - 3 sets - 10 reps    Comprehension of Education:  [x] Verbalizes understanding. [] Demonstrates understanding. [x] Needs review. [] Demonstrates/verbalizes HEP/Ed previously given. Plan: [x] Continue current frequency toward long and short term goals.           Time In: 1300             Time Out: 1344    Electronically signed by:  Juan F Rodriguez, PT

## 2023-03-22 ENCOUNTER — HOSPITAL ENCOUNTER (OUTPATIENT)
Dept: PHYSICAL THERAPY | Facility: CLINIC | Age: 63
Setting detail: THERAPIES SERIES
Discharge: HOME | End: 2023-03-22
Payer: COMMERCIAL

## 2023-03-22 PROCEDURE — 97110 THERAPEUTIC EXERCISES: CPT

## 2023-03-22 NOTE — FLOWSHEET NOTE
decrease shoulder pain       Pt. Education:  [x] Yes  [] No  [x] Reviewed Prior HEP/Ed  Method of Education: [x] Verbal  [x] Demo  [x] Written    Access Code: [email or web address]  URL: [email or web address]  Date: 02/22/2023  Prepared by: Colton Olivera     Exercises  Doorway Pec Stretch at 90 Degrees Abduction - 2 x daily - 7 x weekly - 3 sets - 30 (sec) hold  Doorway Pec Stretch at 120 Degrees Abduction - 2 x daily - 7 x weekly - 2 sets - 30 (sec) hold  Standing Shoulder Row with Anchored Resistance - 1 x daily - 7 x weekly - 2 sets - 10 reps  Shoulder Extension with Resistance - 1 x daily - 7 x weekly - 2 sets - 10 reps  Shoulder External Rotation with Anchored Resistance - 1 x daily - 7 x weekly - 2 sets - 10 reps  Seated Scapular Retraction - 2 x daily - 7 x weekly - 3 sets - 10 reps - 5 (sec) hold  Correct Seated Posture - 1 x daily - 7 x weekly - 3 sets - 10 reps    Comprehension of Education:  [x] Verbalizes understanding. [] Demonstrates understanding. [x] Needs review. [] Demonstrates/verbalizes HEP/Ed previously given. Plan: [x] Continue current frequency toward long and short term goals.           Time In: 1300             Time Out: 1344    Electronically signed by:  Hesham Costello PTA

## 2023-03-24 ENCOUNTER — HOSPITAL ENCOUNTER (OUTPATIENT)
Dept: PHYSICAL THERAPY | Facility: CLINIC | Age: 63
Setting detail: THERAPIES SERIES
Discharge: HOME | End: 2023-03-24
Payer: COMMERCIAL

## 2023-03-24 PROCEDURE — 97110 THERAPEUTIC EXERCISES: CPT

## 2023-03-24 NOTE — FLOWSHEET NOTE
90 2x10 Lime/Orange  1 set of each color          Standing      Flexion  2#    Scaption      Abduction  2#    Standing snow allie    Maintain scapular retraction/depression         Lower trapezius raises at wall  x15     Push up plus- at counter   2x15           Sidelying       ER x20 2#    Horiz abd  x20 2#          Body Blade  X30\" ea  At side and at 90 degrees abd    Body blade dynamic  x10  Moving from 0 to 90 deg flexion    Other: Manual MFR to the bilateral pectoralis muscles - Held 3/9/23      Specific Instructions for next treatment: Assess pec minor tightness, progress band exercises        Treatment Charges: Mins Units   []  Modalities     [x]  Ther Exercise 43 3   []  Manual Therapy     []  Ther Activities     []  Aquatics     []  Vasocompression     []  Other     Total Treatment time 43 3       Assessment: [x] Progressing toward goals. Continued to progress program, adding should stabilization exercises. He denies pain throughout session, though has some \"clicking\" and \"popping\" with sidelying exercises added this visit. He has an appointment with ortho for which there was discussion of another injection. Patient not scheduled at this time, encouraged him to call after seeing ortho to determine plan for continued therapy. [] No change. [] Other:  [x] Patient would continue to benefit from skilled physical therapy services in order to:  improve posture, improve UE strength, improve shoulder stiffness and mobility, and decrease pain so he can return to his normal ADL's as desires       Goals  MET NOT MET ON-  GOING  Details   Date Addressed: 3/17/23           STG: To be met in 6 treatments            1. ? Pain: Decrease pain levels to 3/10 with ADLs [x]  []  []      2. ? ROM: Increase bilateral shoulder AROM limitations throughout to equal bilat without pain to reduce difficulty with ADLs [x]  []  []      3.  Patient to demonstrate improvements in his dynamic posture requiring minimal cueing during

## 2023-03-27 ENCOUNTER — OFFICE VISIT (OUTPATIENT)
Dept: ORTHOPEDIC SURGERY | Age: 63
End: 2023-03-27
Payer: COMMERCIAL

## 2023-03-27 VITALS — RESPIRATION RATE: 16 BRPM | BODY MASS INDEX: 29.18 KG/M2 | WEIGHT: 197 LBS | HEIGHT: 69 IN

## 2023-03-27 DIAGNOSIS — M75.21 BICEPS TENDINITIS, RIGHT: ICD-10-CM

## 2023-03-27 DIAGNOSIS — M75.102 ROTATOR CUFF SYNDROME OF LEFT SHOULDER: Primary | ICD-10-CM

## 2023-03-27 DIAGNOSIS — M19.012 ARTHRITIS OF LEFT ACROMIOCLAVICULAR JOINT: ICD-10-CM

## 2023-03-27 DIAGNOSIS — M75.101 ROTATOR CUFF SYNDROME, RIGHT: ICD-10-CM

## 2023-03-27 PROCEDURE — 99213 OFFICE O/P EST LOW 20 MIN: CPT | Performed by: PHYSICIAN ASSISTANT

## 2023-03-27 PROCEDURE — 20606 DRAIN/INJ JOINT/BURSA W/US: CPT | Performed by: PHYSICIAN ASSISTANT

## 2023-03-27 PROCEDURE — 20550 NJX 1 TENDON SHEATH/LIGAMENT: CPT | Performed by: PHYSICIAN ASSISTANT

## 2023-03-27 RX ORDER — BETAMETHASONE SODIUM PHOSPHATE AND BETAMETHASONE ACETATE 3; 3 MG/ML; MG/ML
6 INJECTION, SUSPENSION INTRA-ARTICULAR; INTRALESIONAL; INTRAMUSCULAR; SOFT TISSUE ONCE
Status: COMPLETED | OUTPATIENT
Start: 2023-03-27 | End: 2023-03-27

## 2023-03-27 RX ORDER — LIDOCAINE HYDROCHLORIDE 10 MG/ML
4 INJECTION, SOLUTION INFILTRATION; PERINEURAL ONCE
Status: COMPLETED | OUTPATIENT
Start: 2023-03-27 | End: 2023-03-27

## 2023-03-27 RX ORDER — METHYLPREDNISOLONE ACETATE 80 MG/ML
80 INJECTION, SUSPENSION INTRA-ARTICULAR; INTRALESIONAL; INTRAMUSCULAR; SOFT TISSUE ONCE
Status: COMPLETED | OUTPATIENT
Start: 2023-03-27 | End: 2023-03-27

## 2023-03-27 RX ADMIN — LIDOCAINE HYDROCHLORIDE 4 ML: 10 INJECTION, SOLUTION INFILTRATION; PERINEURAL at 14:18

## 2023-03-27 RX ADMIN — BETAMETHASONE SODIUM PHOSPHATE AND BETAMETHASONE ACETATE 6 MG: 3; 3 INJECTION, SUSPENSION INTRA-ARTICULAR; INTRALESIONAL; INTRAMUSCULAR; SOFT TISSUE at 14:17

## 2023-03-27 RX ADMIN — METHYLPREDNISOLONE ACETATE 80 MG: 80 INJECTION, SUSPENSION INTRA-ARTICULAR; INTRALESIONAL; INTRAMUSCULAR; SOFT TISSUE at 14:17

## 2023-03-27 NOTE — PROGRESS NOTES
also has significant arthritic Tritus of his left AC joint where he has pain today. Today we opted for a cortisone injection into the right bicep tendon sheath and left AC joint to help reduce inflammation and pain. The injection site should never get red, hot, or swollen and if it does the patient will contact our office right away. The patient may experience a increase in soreness the first 24-48 hours due to a cortisone flair and can take anti-inflammatories for a short period of time to reduce that soreness. The patient should not submerge the injection site in water for a minimum of 24 hours to avoid infection. This means no lakes, pools, ponds, or hot tubs for 24 hours. If the patient is diabetic the injection may increase their blood sugar for up to one week. The patient can do this cortisone injection once every 3-4 months as needed. If the injections stop working and do not give the patient relief the patient should consider surgical interventions to produce long term relief. We are going to do these injections and then going to have the patient follow-up in 6 weeks with Dr. Yasmin Bello to see how well they did. He should continue the exercises that he learned at physical therapy. He will keep a journal on how well the injections worked and what got better and what did not. He will then discuss those with Dr. Yasmin Bello to determine if a surgery is needed. He will call if he has any questions or concerns. Follow up:Return in about 6 weeks (around 5/8/2023). Orders Placed This Encounter   Medications    lidocaine 1 % injection 4 mL    methylPREDNISolone acetate (DEPO-MEDROL) injection 80 mg    betamethasone acetate-betamethasone sodium phosphate (CELESTONE) injection 6 mg           No orders of the defined types were placed in this encounter.       This note is created with the assistance of a speech recognition program.  While intending to generate a document that actually reflects the content of the

## 2023-03-28 ENCOUNTER — TELEPHONE (OUTPATIENT)
Dept: ORTHOPEDIC SURGERY | Age: 63
End: 2023-03-28

## 2023-03-28 NOTE — TELEPHONE ENCOUNTER
Patient was seen yesterday and would like to know if he is to continue with PT.     Call back [phone]

## 2023-03-28 NOTE — TELEPHONE ENCOUNTER
Spoke with patient. Per Jeanette's note it stated to continue what he learned from PT at home. He stated he learned a lot and will continue with those exercises. He had no further questions at this time.

## 2023-03-30 ENCOUNTER — TELEPHONE (OUTPATIENT)
Dept: ORTHOPEDIC SURGERY | Age: 63
End: 2023-03-30

## 2023-05-19 DIAGNOSIS — M75.102 ROTATOR CUFF SYNDROME OF LEFT SHOULDER: Primary | ICD-10-CM

## 2023-05-19 DIAGNOSIS — M75.101 ROTATOR CUFF SYNDROME, RIGHT: ICD-10-CM

## 2023-05-24 ENCOUNTER — OFFICE VISIT (OUTPATIENT)
Dept: ORTHOPEDIC SURGERY | Age: 63
End: 2023-05-24
Payer: COMMERCIAL

## 2023-05-24 VITALS — BODY MASS INDEX: 29.33 KG/M2 | RESPIRATION RATE: 16 BRPM | WEIGHT: 198 LBS | HEIGHT: 69 IN

## 2023-05-24 DIAGNOSIS — M75.21 BICEPS TENDINITIS, RIGHT: ICD-10-CM

## 2023-05-24 DIAGNOSIS — M75.102 ROTATOR CUFF SYNDROME OF LEFT SHOULDER: Primary | ICD-10-CM

## 2023-05-24 DIAGNOSIS — M19.012 ARTHRITIS OF LEFT ACROMIOCLAVICULAR JOINT: ICD-10-CM

## 2023-05-24 DIAGNOSIS — M70.21 OLECRANON BURSITIS OF RIGHT ELBOW: ICD-10-CM

## 2023-05-24 DIAGNOSIS — M75.101 ROTATOR CUFF SYNDROME, RIGHT: ICD-10-CM

## 2023-05-24 PROCEDURE — 99214 OFFICE O/P EST MOD 30 MIN: CPT | Performed by: ORTHOPAEDIC SURGERY

## 2023-05-25 ASSESSMENT — ENCOUNTER SYMPTOMS
VOMITING: 0
ABDOMINAL PAIN: 0
COUGH: 0
ABDOMINAL DISTENTION: 0
CHEST TIGHTNESS: 0
SHORTNESS OF BREATH: 0
RESPIRATORY NEGATIVE: 1
COLOR CHANGE: 0
APNEA: 0
NAUSEA: 0
DIARRHEA: 0
CONSTIPATION: 0

## 2023-09-11 ENCOUNTER — OFFICE VISIT (OUTPATIENT)
Dept: FAMILY MEDICINE CLINIC | Age: 63
End: 2023-09-11
Payer: COMMERCIAL

## 2023-09-11 VITALS
WEIGHT: 173 LBS | OXYGEN SATURATION: 97 % | BODY MASS INDEX: 25.62 KG/M2 | DIASTOLIC BLOOD PRESSURE: 83 MMHG | HEART RATE: 56 BPM | SYSTOLIC BLOOD PRESSURE: 137 MMHG | HEIGHT: 69 IN

## 2023-09-11 DIAGNOSIS — N52.01 ERECTILE DYSFUNCTION DUE TO ARTERIAL INSUFFICIENCY: ICD-10-CM

## 2023-09-11 DIAGNOSIS — K51.011 ULCERATIVE PANCOLITIS WITH RECTAL BLEEDING (HCC): ICD-10-CM
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
## 2023-09-11 PROCEDURE — 99214 OFFICE O/P EST MOD 30 MIN: CPT | Performed by: FAMILY MEDICINE

## 2023-09-11 PROCEDURE — 3075F SYST BP GE 130 - 139MM HG: CPT | Performed by: FAMILY MEDICINE

## 2023-09-11 PROCEDURE — 3079F DIAST BP 80-89 MM HG: CPT | Performed by: FAMILY MEDICINE

## 2023-09-11 RX ORDER — DOXYCYCLINE 100 MG/1
100 CAPSULE ORAL 2 TIMES DAILY
Qty: 14 CAPSULE | Refills: 0 | Status: SHIPPED | OUTPATIENT
Start: 2023-09-11 | End: 2023-09-18

## 2023-09-11 RX ORDER — PREDNISONE 10 MG/1
10 TABLET ORAL DAILY
Qty: 30 TABLET | Refills: 0 | Status: SHIPPED | OUTPATIENT
Start: 2023-09-11 | End: 2023-10-11

## 2023-09-11 RX ORDER — SILDENAFIL 100 MG/1
100 TABLET, FILM COATED ORAL DAILY PRN
Qty: 15 TABLET | Refills: 11 | Status: SHIPPED | OUTPATIENT
Start: 2023-09-11

## 2023-09-11 NOTE — PROGRESS NOTES
[facility]  [address] [place] [address] Road [address]  Dept: [phone]      Angely Hines is a 61 y.o. male who presents today for follow up on his  medical conditions as noted below.       Chief Complaint   Patient presents with    Insect Bite       Patient Active Problem List:     Seasonal allergies     Strep pharyngitis     BP (high blood pressure)     Cerebrovascular accident (CVA) ([address])     Ulcerative colitis ([address])     Past Medical History:   Diagnosis Date    BP (high blood pressure) 4/18/2017    Fatty liver     Seasonal allergies     Stroke [facility])       Past Surgical History:   Procedure Laterality Date    TONSILLECTOMY       Family History   Problem Relation Age of Onset    Cancer Mother        Current Outpatient Medications   Medication Sig Dispense Refill    doxycycline monohydrate (MONODOX) 100 MG capsule Take 1 capsule by mouth 2 times daily for 7 days 14 capsule 0    predniSONE (DELTASONE) 10 MG tablet Take 1 tablet by mouth daily 30 tablet 0    sildenafil (VIAGRA) 100 MG tablet Take 1 tablet by mouth daily as needed for Erectile Dysfunction 15 tablet 11    tadalafil (CIALIS) 5 MG tablet take 1 tablet by mouth once daily if needed 30 tablet 11    colestipol (COLESTID) 1 g tablet take 1 tablet by mouth once daily (TAKE 2 HOURS APART FROM OTHER MEDICATIONS)      diclofenac sodium (VOLTAREN) 1 % GEL Apply 4 g topically 4 times daily as needed for Pain 200 g 2    lisinopril (PRINIVIL;ZESTRIL) 5 MG tablet take 1 tablet by mouth once daily      metoprolol succinate (TOPROL XL) 25 MG extended release tablet take 1 tablet by mouth once daily      Vedolizumab (ENTYVIO IV) Infuse intravenously      levocetirizine (XYZAL) 5 MG tablet take 1 tablet by mouth every evening 30 tablet 11    rosuvastatin (CRESTOR) 20 MG tablet take 1 tablet by mouth every evening (Patient not taking: Reported on 9/11/2023) 90 tablet 3    ALREX 0.2 % SUSP  (Patient not

## 2023-10-24 DIAGNOSIS — K51.011 ULCERATIVE PANCOLITIS WITH RECTAL BLEEDING (HCC): ICD-10-CM

## 2023-10-24 RX ORDER — PREDNISONE 10 MG/1
10 TABLET ORAL DAILY
Qty: 30 TABLET | Refills: 0 | Status: SHIPPED | OUTPATIENT
Start: 2023-10-24

## 2023-10-24 NOTE — TELEPHONE ENCOUNTER
Diane Hayes is calling to request a refill on the following medication(s):    Last Visit Date (If Applicable):  7/35/3408    Next Visit Date:    Visit date not found    Medication Request:  Requested Prescriptions     Pending Prescriptions Disp Refills    predniSONE (DELTASONE) 10 MG tablet [Pharmacy Med Name: PREDNISONE 10 MG TABLET] 30 tablet 0     Sig: take 1 tablet by mouth once daily

## 2023-12-13 ENCOUNTER — OFFICE VISIT (OUTPATIENT)
Dept: FAMILY MEDICINE CLINIC | Age: 63
End: 2023-12-13
Payer: COMMERCIAL

## 2023-12-13 VITALS
OXYGEN SATURATION: 99 % | BODY MASS INDEX: 27.85 KG/M2 | SYSTOLIC BLOOD PRESSURE: 122 MMHG | HEIGHT: 69 IN | WEIGHT: 188 LBS | DIASTOLIC BLOOD PRESSURE: 74 MMHG | HEART RATE: 79 BPM

## 2023-12-13 DIAGNOSIS — S30.0XXD TRAUMATIC HEMATOMA OF BUTTOCK, SUBSEQUENT ENCOUNTER: ICD-10-CM

## 2023-12-13 DIAGNOSIS — S20.211D CONTUSION OF RIB ON RIGHT SIDE, SUBSEQUENT ENCOUNTER: Primary | ICD-10-CM

## 2023-12-13 DIAGNOSIS — K51.011 ULCERATIVE PANCOLITIS WITH RECTAL BLEEDING (HCC): ICD-10-CM

## 2023-12-13 PROCEDURE — 3074F SYST BP LT 130 MM HG: CPT | Performed by: FAMILY MEDICINE

## 2023-12-13 PROCEDURE — 3078F DIAST BP <80 MM HG: CPT | Performed by: FAMILY MEDICINE

## 2023-12-13 PROCEDURE — 99214 OFFICE O/P EST MOD 30 MIN: CPT | Performed by: FAMILY MEDICINE

## 2023-12-13 RX ORDER — KETOROLAC TROMETHAMINE 10 MG/1
10 TABLET, FILM COATED ORAL EVERY 6 HOURS PRN
Qty: 20 TABLET | Refills: 0 | Status: SHIPPED | OUTPATIENT
Start: 2023-12-13 | End: 2024-12-12

## 2023-12-13 RX ORDER — PREDNISONE 10 MG/1
5 TABLET ORAL DAILY
Qty: 30 TABLET | Refills: 0 | Status: SHIPPED | OUTPATIENT
Start: 2023-12-13

## 2023-12-13 RX ORDER — METHOCARBAMOL 500 MG/1
500 TABLET, FILM COATED ORAL 2 TIMES DAILY
COMMUNITY
Start: 2023-12-06

## 2023-12-13 RX ORDER — HYDROCODONE BITARTRATE AND ACETAMINOPHEN 5; 325 MG/1; MG/1
1 TABLET ORAL EVERY 4 HOURS PRN
Qty: 42 TABLET | Refills: 0 | Status: SHIPPED | OUTPATIENT
Start: 2023-12-13 | End: 2023-12-20

## 2023-12-13 RX ORDER — USTEKINUMAB 90 MG/ML
INJECTION, SOLUTION SUBCUTANEOUS
COMMUNITY
Start: 2023-07-07

## 2023-12-13 RX ORDER — SILODOSIN 4 MG/1
4 CAPSULE ORAL
COMMUNITY
Start: 2023-12-01 | End: 2024-03-30

## 2023-12-13 RX ORDER — OXYCODONE HYDROCHLORIDE AND ACETAMINOPHEN 5; 325 MG/1; MG/1
1 TABLET ORAL EVERY 6 HOURS PRN
COMMUNITY
Start: 2023-12-06

## 2023-12-13 NOTE — PROGRESS NOTES
no rales. Abdominal: Soft. Bowel sounds are normal. He exhibits no distension and no mass. There is no tenderness. There is no rebound and no guarding. Genitourinary/Anorectal:deferred  Musculoskeletal: Normal range of motion. He exhibits ++edema or tenderness. He has a very large hematoma in his lower lumbar into the coccyx and buttocks area he has a small ecchymotic area on his lower right rib region and 1 on his upper right arm  Lymphadenopathy: He has no cervical adenopathy. Neurological: He is alert and oriented to person, place, and time. He has normal reflexes. Skin: Skin is warm and dry. No rash noted. Psychiatric: He has a normal mood and affect. His   behavior is normal.       Assessment:      1. Contusion of rib on right side, subsequent encounter    2. Traumatic hematoma of buttock, subsequent encounter    3. Ulcerative pancolitis with rectal bleeding [facility])          Plan:      Call or return to clinic prn if these symptoms worsen or fail to improve as anticipated. I have reviewed the instructions with the patient, answering all questions to his satisfaction. No follow-ups on file. No orders of the defined types were placed in this encounter. Orders Placed This Encounter   Medications    ketorolac (TORADOL) 10 MG tablet     Sig: Take 1 tablet by mouth every 6 hours as needed for Pain     Dispense:  20 tablet     Refill:  0    HYDROcodone-acetaminophen (NORCO) 5-325 MG per tablet     Sig: Take 1 tablet by mouth every 4 hours as needed for Pain for up to 7 days. Intended supply: 7 days.  Take lowest dose possible to manage pain Max Daily Amount: 6 tablets     Dispense:  42 tablet     Refill:  0     Reduce doses taken as pain becomes manageable    predniSONE (DELTASONE) 10 MG tablet     Sig: Take 0.5 tablets by mouth daily     Dispense:  30 tablet     Refill:  0     He needs to continue to ice rest  Electronically signed by Paco Hewitt DO on 12/13/2023 at 4:56 PM

## 2024-07-11 ENCOUNTER — OFFICE VISIT (OUTPATIENT)
Dept: FAMILY MEDICINE CLINIC | Age: 64
End: 2024-07-11
Payer: COMMERCIAL

## 2024-07-11 VITALS
SYSTOLIC BLOOD PRESSURE: 154 MMHG | BODY MASS INDEX: 30.04 KG/M2 | WEIGHT: 203.4 LBS | OXYGEN SATURATION: 98 % | HEART RATE: 67 BPM | DIASTOLIC BLOOD PRESSURE: 90 MMHG

## 2024-07-11 DIAGNOSIS — N20.0 KIDNEY STONE: Primary | ICD-10-CM

## 2024-07-11 DIAGNOSIS — L02.411 ABSCESS OF RIGHT AXILLA: ICD-10-CM

## 2024-07-11 PROCEDURE — 99214 OFFICE O/P EST MOD 30 MIN: CPT | Performed by: FAMILY MEDICINE

## 2024-07-11 PROCEDURE — 3080F DIAST BP >= 90 MM HG: CPT | Performed by: FAMILY MEDICINE

## 2024-07-11 PROCEDURE — 96372 THER/PROPH/DIAG INJ SC/IM: CPT | Performed by: FAMILY MEDICINE

## 2024-07-11 PROCEDURE — 3077F SYST BP >= 140 MM HG: CPT | Performed by: FAMILY MEDICINE

## 2024-07-11 RX ORDER — CEFTRIAXONE 1 G/1
1000 INJECTION, POWDER, FOR SOLUTION INTRAMUSCULAR; INTRAVENOUS ONCE
Status: COMPLETED | OUTPATIENT
Start: 2024-07-11 | End: 2024-07-11

## 2024-07-11 RX ORDER — HYDROCODONE BITARTRATE AND ACETAMINOPHEN 5; 325 MG/1; MG/1
1 TABLET ORAL EVERY 4 HOURS PRN
Qty: 42 TABLET | Refills: 0 | Status: SHIPPED | OUTPATIENT
Start: 2024-07-11 | End: 2024-07-18

## 2024-07-11 RX ORDER — LIDOCAINE HYDROCHLORIDE 10 MG/ML
5 INJECTION, SOLUTION INFILTRATION; PERINEURAL ONCE
Status: COMPLETED | OUTPATIENT
Start: 2024-07-11 | End: 2024-07-11

## 2024-07-11 RX ADMIN — CEFTRIAXONE 1000 MG: 1 INJECTION, POWDER, FOR SOLUTION INTRAMUSCULAR; INTRAVENOUS at 13:23

## 2024-07-11 RX ADMIN — LIDOCAINE HYDROCHLORIDE 2.1 ML: 10 INJECTION, SOLUTION INFILTRATION; PERINEURAL at 13:25

## 2024-07-11 SDOH — ECONOMIC STABILITY: FOOD INSECURITY: WITHIN THE PAST 12 MONTHS, THE FOOD YOU BOUGHT JUST DIDN'T LAST AND YOU DIDN'T HAVE MONEY TO GET MORE.: NEVER TRUE

## 2024-07-11 SDOH — ECONOMIC STABILITY: FOOD INSECURITY: WITHIN THE PAST 12 MONTHS, YOU WORRIED THAT YOUR FOOD WOULD RUN OUT BEFORE YOU GOT MONEY TO BUY MORE.: NEVER TRUE

## 2024-07-11 SDOH — ECONOMIC STABILITY: INCOME INSECURITY: HOW HARD IS IT FOR YOU TO PAY FOR THE VERY BASICS LIKE FOOD, HOUSING, MEDICAL CARE, AND HEATING?: NOT HARD AT ALL

## 2024-07-11 ASSESSMENT — PATIENT HEALTH QUESTIONNAIRE - PHQ9
SUM OF ALL RESPONSES TO PHQ QUESTIONS 1-9: 0
SUM OF ALL RESPONSES TO PHQ QUESTIONS 1-9: 0
1. LITTLE INTEREST OR PLEASURE IN DOING THINGS: NOT AT ALL
SUM OF ALL RESPONSES TO PHQ9 QUESTIONS 1 & 2: 0
SUM OF ALL RESPONSES TO PHQ QUESTIONS 1-9: 0
SUM OF ALL RESPONSES TO PHQ QUESTIONS 1-9: 0
DEPRESSION UNABLE TO ASSESS: FUNCTIONAL CAPACITY MOTIVATION LIMITS ACCURACY
2. FEELING DOWN, DEPRESSED OR HOPELESS: NOT AT ALL

## 2024-07-11 NOTE — PROGRESS NOTES
[facility]  [facility] MEDICINE  [address]  [address]  [facility] [address]  Dept: [phone]      Rico Smith is a 63 y.o. male who presents today for follow up on his  medical conditions as noted below.      Chief Complaint   Patient presents with    Mass     Lump under arm       Patient Active Problem List:     Seasonal allergies     Strep pharyngitis     BP (high blood pressure)     Cerebrovascular accident (CVA) (HCC)     Ulcerative colitis (HCC)     Past Medical History:   Diagnosis Date    BP (high blood pressure) 4/18/2017    Fatty liver     Seasonal allergies     Stroke (HCC)       Past Surgical History:   Procedure Laterality Date    TONSILLECTOMY       Family History   Problem Relation Age of Onset    Cancer Mother        Current Outpatient Medications   Medication Sig Dispense Refill    HYDROcodone-acetaminophen (NORCO) 5-325 MG per tablet Take 1 tablet by mouth every 4 hours as needed for Pain for up to 7 days. Intended supply: 7 days. Take lowest dose possible to manage pain Max Daily Amount: 6 tablets 42 tablet 0    lisinopril (PRINIVIL;ZESTRIL) 5 MG tablet take 1 tablet by mouth once daily 60 tablet 3    metoprolol succinate (TOPROL XL) 25 MG extended release tablet take 1 tablet by mouth once daily 60 tablet 3    STELARA 90 MG/ML SOSY prefilled syringe Inject 1mL (90mg) subcutaneously every 8 weeks      methocarbamol (ROBAXIN) 500 MG tablet Take 1 tablet by mouth 2 times daily      oxyCODONE-acetaminophen (PERCOCET) 5-325 MG per tablet Take 1 tablet by mouth every 6 hours as needed for Pain. for pain for up to 5 days      silodosin (RAPAFLO) 4 MG CAPS capsule Take 1 capsule by mouth daily (with breakfast)      ketorolac (TORADOL) 10 MG tablet Take 1 tablet by mouth every 6 hours as needed for Pain 20 tablet 0    predniSONE (DELTASONE) 10 MG tablet Take 0.5 tablets by mouth daily 30 tablet 0    sildenafil (VIAGRA) 100 MG tablet Take 1 tablet by mouth

## 2024-09-05 ENCOUNTER — OFFICE VISIT (OUTPATIENT)
Dept: FAMILY MEDICINE CLINIC | Age: 64
End: 2024-09-05
Payer: COMMERCIAL

## 2024-09-05 VITALS
HEIGHT: 69 IN | DIASTOLIC BLOOD PRESSURE: 87 MMHG | WEIGHT: 200 LBS | BODY MASS INDEX: 29.62 KG/M2 | SYSTOLIC BLOOD PRESSURE: 142 MMHG | OXYGEN SATURATION: 98 % | HEART RATE: 96 BPM

## 2024-09-05 DIAGNOSIS — N20.0 KIDNEY STONE: Primary | ICD-10-CM

## 2024-09-05 PROCEDURE — 99213 OFFICE O/P EST LOW 20 MIN: CPT | Performed by: FAMILY MEDICINE

## 2024-09-05 PROCEDURE — 3077F SYST BP >= 140 MM HG: CPT | Performed by: FAMILY MEDICINE

## 2024-09-05 PROCEDURE — 96372 THER/PROPH/DIAG INJ SC/IM: CPT | Performed by: FAMILY MEDICINE

## 2024-09-05 PROCEDURE — 3079F DIAST BP 80-89 MM HG: CPT | Performed by: FAMILY MEDICINE

## 2024-09-05 RX ORDER — KETOROLAC TROMETHAMINE 30 MG/ML
60 INJECTION, SOLUTION INTRAMUSCULAR; INTRAVENOUS ONCE
Status: COMPLETED | OUTPATIENT
Start: 2024-09-05 | End: 2024-09-05

## 2024-09-05 RX ORDER — PHENAZOPYRIDINE HYDROCHLORIDE 200 MG/1
200 TABLET, FILM COATED ORAL 3 TIMES DAILY PRN
COMMUNITY
Start: 2024-08-26

## 2024-09-05 RX ORDER — HYDROCODONE BITARTRATE AND ACETAMINOPHEN 5; 325 MG/1; MG/1
1 TABLET ORAL EVERY 4 HOURS PRN
Qty: 42 TABLET | Refills: 0 | Status: SHIPPED | OUTPATIENT
Start: 2024-09-05 | End: 2024-09-12

## 2024-09-05 RX ORDER — TAMSULOSIN HYDROCHLORIDE 0.4 MG/1
0.4 CAPSULE ORAL
COMMUNITY
Start: 2024-03-14 | End: 2024-09-07

## 2024-09-05 RX ADMIN — KETOROLAC TROMETHAMINE 60 MG: 30 INJECTION, SOLUTION INTRAMUSCULAR; INTRAVENOUS at 11:53

## 2024-09-05 NOTE — PROGRESS NOTES
tablet by mouth every evening 30 tablet 11    STELARA 90 MG/ML SOSY prefilled syringe Inject 1mL (90mg) subcutaneously every 8 weeks (Patient not taking: Reported on 9/5/2024)      methocarbamol (ROBAXIN) 500 MG tablet Take 1 tablet by mouth 2 times daily (Patient not taking: Reported on 9/5/2024)      silodosin (RAPAFLO) 4 MG CAPS capsule Take 1 capsule by mouth daily (with breakfast)      predniSONE (DELTASONE) 10 MG tablet Take 0.5 tablets by mouth daily (Patient not taking: Reported on 9/5/2024) 30 tablet 0    tadalafil (CIALIS) 5 MG tablet take 1 tablet by mouth once daily if needed (Patient not taking: Reported on 12/13/2023) 30 tablet 11    rosuvastatin (CRESTOR) 20 MG tablet take 1 tablet by mouth every evening (Patient not taking: Reported on 9/11/2023) 90 tablet 3    colestipol (COLESTID) 1 g tablet take 1 tablet by mouth once daily (TAKE 2 HOURS APART FROM OTHER MEDICATIONS) (Patient not taking: Reported on 9/5/2024)      diclofenac sodium (VOLTAREN) 1 % GEL Apply 4 g topically 4 times daily as needed for Pain (Patient not taking: Reported on 12/13/2023) 200 g 2    Vedolizumab (ENTYVIO IV) Infuse intravenously (Patient not taking: Reported on 12/13/2023)      ALREX 0.2 % SUSP  (Patient not taking: Reported on 11/1/2021)  0     No current facility-administered medications for this visit.     ALLERGIES:    Allergies   Allergen Reactions    Clindamycin/Lincomycin Diarrhea    Seasonal        Social History     Tobacco Use    Smoking status: Never    Smokeless tobacco: Never   Substance Use Topics    Alcohol use: No        HDL (mg/dL)   Date Value   11/14/2022 38 (L)     BUN (mg/dL)   Date Value   11/14/2022 10     Creatinine (mg/dL)   Date Value   11/14/2022 0.91     Glucose (mg/dL)   Date Value   11/14/2022 94              Subjective:      HPI  He is being seen today secondary to ongoing problems and regarding a obstructed renal stone he has had multiple stents placed he ended up septic and is having

## 2025-07-28 ENCOUNTER — OFFICE VISIT (OUTPATIENT)
Dept: FAMILY MEDICINE CLINIC | Age: 65
End: 2025-07-28
Payer: MEDICARE

## 2025-07-28 VITALS
HEART RATE: 74 BPM | WEIGHT: 189 LBS | HEIGHT: 69 IN | OXYGEN SATURATION: 98 % | SYSTOLIC BLOOD PRESSURE: 134 MMHG | BODY MASS INDEX: 27.99 KG/M2 | DIASTOLIC BLOOD PRESSURE: 78 MMHG

## 2025-07-28 DIAGNOSIS — M92.62 HAGLUND DEFORMITY OF LEFT HEEL: Primary | ICD-10-CM

## 2025-07-28 DIAGNOSIS — D18.01 HEMANGIOMA OF SKIN: ICD-10-CM

## 2025-07-28 PROCEDURE — 3075F SYST BP GE 130 - 139MM HG: CPT | Performed by: FAMILY MEDICINE

## 2025-07-28 PROCEDURE — 3078F DIAST BP <80 MM HG: CPT | Performed by: FAMILY MEDICINE

## 2025-07-28 PROCEDURE — 99214 OFFICE O/P EST MOD 30 MIN: CPT | Performed by: FAMILY MEDICINE

## 2025-07-28 PROCEDURE — 1123F ACP DISCUSS/DSCN MKR DOCD: CPT | Performed by: FAMILY MEDICINE

## 2025-07-28 SDOH — ECONOMIC STABILITY: FOOD INSECURITY: WITHIN THE PAST 12 MONTHS, THE FOOD YOU BOUGHT JUST DIDN'T LAST AND YOU DIDN'T HAVE MONEY TO GET MORE.: NEVER TRUE

## 2025-07-28 SDOH — ECONOMIC STABILITY: FOOD INSECURITY: WITHIN THE PAST 12 MONTHS, YOU WORRIED THAT YOUR FOOD WOULD RUN OUT BEFORE YOU GOT MONEY TO BUY MORE.: NEVER TRUE

## 2025-07-28 ASSESSMENT — PATIENT HEALTH QUESTIONNAIRE - PHQ9
SUM OF ALL RESPONSES TO PHQ QUESTIONS 1-9: 0
2. FEELING DOWN, DEPRESSED OR HOPELESS: NOT AT ALL
1. LITTLE INTEREST OR PLEASURE IN DOING THINGS: NOT AT ALL
SUM OF ALL RESPONSES TO PHQ QUESTIONS 1-9: 0

## 2025-07-28 NOTE — PROGRESS NOTES
[facility]  [facility] MEDICINE  [address]  [address]  [facility] [address]  Dept: [phone]      Rico Smith is a 65 y.o. male who presents today for follow up on his  medical conditions as noted below.      Chief Complaint   Patient presents with    Ankle Pain     Left        Patient Active Problem List:     Seasonal allergies     Strep pharyngitis     BP (high blood pressure)     Cerebrovascular accident (CVA) (HCC)     Ulcerative colitis (HCC)     Past Medical History:   Diagnosis Date    BP (high blood pressure) 4/18/2017    Fatty liver     Seasonal allergies     Stroke (HCC)       Past Surgical History:   Procedure Laterality Date    TONSILLECTOMY       Family History   Problem Relation Age of Onset    Cancer Mother        Current Outpatient Medications   Medication Sig Dispense Refill    lisinopril (PRINIVIL;ZESTRIL) 5 MG tablet TAKE 1 TABLET BY MOUTH DAILY 90 tablet 3    metoprolol succinate (TOPROL XL) 25 MG extended release tablet TAKE 1 TABLET BY MOUTH DAILY 90 tablet 3    B Complex-C-Folic Acid (RENAL) 1 MG CAPS Take 1 capsule by mouth daily      tamsulosin (FLOMAX) 0.4 MG capsule Take 1 capsule by mouth nightly      phenazopyridine (PYRIDIUM) 200 MG tablet Take 1 tablet by mouth 3 times daily as needed      Upadacitinib ER 30 MG TB24 Take 1 tablet by mouth daily      STELARA 90 MG/ML SOSY prefilled syringe Inject 1mL (90mg) subcutaneously every 8 weeks (Patient not taking: Reported on 9/5/2024)      methocarbamol (ROBAXIN) 500 MG tablet Take 1 tablet by mouth 2 times daily (Patient not taking: Reported on 9/5/2024)      oxyCODONE-acetaminophen (PERCOCET) 5-325 MG per tablet Take 1 tablet by mouth every 6 hours as needed for Pain. for pain for up to 5 days      silodosin (RAPAFLO) 4 MG CAPS capsule Take 1 capsule by mouth daily (with breakfast)      ketorolac (TORADOL) 10 MG tablet Take 1 tablet by mouth every 6 hours as needed for Pain 20 tablet 0

## 2025-07-28 NOTE — PROGRESS NOTES
[facility]  [facility] MEDICINE  [address]  [address]  [facility] [address]  Dept: [phone]      Rico Smith is a 65 y.o. male who presents today for follow up on his  medical conditions as noted below.      Chief Complaint   Patient presents with    Ankle Pain     Left        Patient Active Problem List:     Seasonal allergies     Strep pharyngitis     BP (high blood pressure)     Cerebrovascular accident (CVA) (HCC)     Ulcerative colitis (HCC)     Past Medical History:   Diagnosis Date    BP (high blood pressure) 4/18/2017    Fatty liver     Seasonal allergies     Stroke (HCC)       Past Surgical History:   Procedure Laterality Date    TONSILLECTOMY       Family History   Problem Relation Age of Onset    Cancer Mother        Current Outpatient Medications   Medication Sig Dispense Refill    lisinopril (PRINIVIL;ZESTRIL) 5 MG tablet TAKE 1 TABLET BY MOUTH DAILY 90 tablet 3    metoprolol succinate (TOPROL XL) 25 MG extended release tablet TAKE 1 TABLET BY MOUTH DAILY 90 tablet 3    B Complex-C-Folic Acid (RENAL) 1 MG CAPS Take 1 capsule by mouth daily      tamsulosin (FLOMAX) 0.4 MG capsule Take 1 capsule by mouth nightly      phenazopyridine (PYRIDIUM) 200 MG tablet Take 1 tablet by mouth 3 times daily as needed      Upadacitinib ER 30 MG TB24 Take 1 tablet by mouth daily      STELARA 90 MG/ML SOSY prefilled syringe Inject 1mL (90mg) subcutaneously every 8 weeks (Patient not taking: Reported on 9/5/2024)      methocarbamol (ROBAXIN) 500 MG tablet Take 1 tablet by mouth 2 times daily (Patient not taking: Reported on 9/5/2024)      oxyCODONE-acetaminophen (PERCOCET) 5-325 MG per tablet Take 1 tablet by mouth every 6 hours as needed for Pain. for pain for up to 5 days      silodosin (RAPAFLO) 4 MG CAPS capsule Take 1 capsule by mouth daily (with breakfast)      ketorolac (TORADOL) 10 MG tablet Take 1 tablet by mouth every 6 hours as needed for Pain 20 tablet 0

## 2025-08-12 ENCOUNTER — TELEPHONE (OUTPATIENT)
Dept: FAMILY MEDICINE CLINIC | Age: 65
End: 2025-08-12

## 2025-08-30 PROBLEM — Z86.73 HISTORY OF STROKE: Status: ACTIVE | Noted: 2017-04-05
